# Patient Record
Sex: FEMALE | Race: WHITE | Employment: OTHER | URBAN - NONMETROPOLITAN AREA
[De-identification: names, ages, dates, MRNs, and addresses within clinical notes are randomized per-mention and may not be internally consistent; named-entity substitution may affect disease eponyms.]

---

## 2018-07-10 RX ORDER — CARVEDILOL 25 MG/1
25 TABLET ORAL 2 TIMES DAILY WITH MEALS
Qty: 180 TABLET | Refills: 3 | Status: SHIPPED | OUTPATIENT
Start: 2018-07-10 | End: 2018-10-30 | Stop reason: SDUPTHER

## 2018-10-12 ENCOUNTER — TELEPHONE (OUTPATIENT)
Dept: CARDIOLOGY | Age: 83
End: 2018-10-12

## 2018-10-12 DIAGNOSIS — E78.5 DYSLIPIDEMIA: ICD-10-CM

## 2018-10-12 DIAGNOSIS — I48.0 PAROXYSMAL ATRIAL FIBRILLATION (HCC): ICD-10-CM

## 2018-10-12 DIAGNOSIS — I10 ESSENTIAL HYPERTENSION: ICD-10-CM

## 2018-10-12 DIAGNOSIS — E05.90 HYPERTHYROIDISM: ICD-10-CM

## 2018-10-12 DIAGNOSIS — I48.0 PAROXYSMAL ATRIAL FIBRILLATION (HCC): Primary | ICD-10-CM

## 2018-10-12 DIAGNOSIS — K44.9 HIATAL HERNIA: ICD-10-CM

## 2018-10-12 DIAGNOSIS — F41.9 ANXIETY: ICD-10-CM

## 2018-10-12 PROBLEM — K51.90 ULCERATIVE COLITIS (HCC): Status: ACTIVE | Noted: 2018-10-12

## 2018-10-12 PROBLEM — M19.90 OSTEOARTHRITIS: Status: ACTIVE | Noted: 2018-10-12

## 2018-10-12 PROBLEM — F32.A DEPRESSION: Status: ACTIVE | Noted: 2018-10-12

## 2018-10-12 PROBLEM — K21.9 GERD (GASTROESOPHAGEAL REFLUX DISEASE): Status: ACTIVE | Noted: 2018-10-12

## 2018-10-12 PROBLEM — E11.9 TYPE 2 DIABETES MELLITUS (HCC): Status: ACTIVE | Noted: 2018-10-12

## 2018-10-12 RX ORDER — CLONIDINE HYDROCHLORIDE 0.1 MG/1
0.2 TABLET ORAL NIGHTLY
COMMUNITY
End: 2020-06-24 | Stop reason: SDUPTHER

## 2018-10-12 RX ORDER — WARFARIN SODIUM 2 MG/1
2 TABLET ORAL DAILY
Qty: 30 TABLET | Refills: 3 | Status: SHIPPED | OUTPATIENT
Start: 2018-10-12 | End: 2018-10-30 | Stop reason: SDUPTHER

## 2018-10-12 RX ORDER — OMEPRAZOLE 20 MG/1
20 CAPSULE, DELAYED RELEASE ORAL NIGHTLY
COMMUNITY

## 2018-10-12 RX ORDER — PRAVASTATIN SODIUM 40 MG
40 TABLET ORAL DAILY
COMMUNITY

## 2018-10-12 RX ORDER — WARFARIN SODIUM 5 MG/1
5 TABLET ORAL
COMMUNITY
End: 2018-10-30 | Stop reason: ALTCHOICE

## 2018-10-12 RX ORDER — WARFARIN SODIUM 5 MG/1
5 TABLET ORAL DAILY
Qty: 30 TABLET | Refills: 0 | Status: CANCELLED | OUTPATIENT
Start: 2018-10-12

## 2018-10-12 RX ORDER — TRAMADOL HYDROCHLORIDE 50 MG/1
50 TABLET ORAL EVERY 6 HOURS PRN
COMMUNITY
End: 2018-10-30 | Stop reason: ALTCHOICE

## 2018-10-12 RX ORDER — LEVOTHYROXINE SODIUM 0.1 MG/1
100 TABLET ORAL DAILY
COMMUNITY

## 2018-10-12 RX ORDER — OXYCODONE HYDROCHLORIDE AND ACETAMINOPHEN 325; 5 MG/5ML; MG/5ML
SOLUTION ORAL EVERY 4 HOURS PRN
COMMUNITY
End: 2018-10-30 | Stop reason: ALTCHOICE

## 2018-10-12 RX ORDER — INDAPAMIDE 2.5 MG/1
2.5 TABLET, FILM COATED ORAL EVERY MORNING
COMMUNITY

## 2018-10-12 RX ORDER — DIGOXIN 125 MCG
125 TABLET ORAL DAILY
COMMUNITY
End: 2022-02-21 | Stop reason: SDUPTHER

## 2018-10-30 ENCOUNTER — OFFICE VISIT (OUTPATIENT)
Dept: CARDIOLOGY | Age: 83
End: 2018-10-30
Payer: MEDICARE

## 2018-10-30 VITALS
OXYGEN SATURATION: 96 % | SYSTOLIC BLOOD PRESSURE: 122 MMHG | WEIGHT: 151.4 LBS | HEIGHT: 62 IN | HEART RATE: 85 BPM | BODY MASS INDEX: 27.86 KG/M2 | DIASTOLIC BLOOD PRESSURE: 62 MMHG

## 2018-10-30 DIAGNOSIS — I48.0 PAROXYSMAL ATRIAL FIBRILLATION (HCC): ICD-10-CM

## 2018-10-30 DIAGNOSIS — I48.20 CHRONIC ATRIAL FIBRILLATION (HCC): ICD-10-CM

## 2018-10-30 DIAGNOSIS — I10 ESSENTIAL HYPERTENSION: Primary | ICD-10-CM

## 2018-10-30 PROCEDURE — G8484 FLU IMMUNIZE NO ADMIN: HCPCS | Performed by: INTERNAL MEDICINE

## 2018-10-30 PROCEDURE — 4040F PNEUMOC VAC/ADMIN/RCVD: CPT | Performed by: INTERNAL MEDICINE

## 2018-10-30 PROCEDURE — 1123F ACP DISCUSS/DSCN MKR DOCD: CPT | Performed by: INTERNAL MEDICINE

## 2018-10-30 PROCEDURE — 1036F TOBACCO NON-USER: CPT | Performed by: INTERNAL MEDICINE

## 2018-10-30 PROCEDURE — G8427 DOCREV CUR MEDS BY ELIG CLIN: HCPCS | Performed by: INTERNAL MEDICINE

## 2018-10-30 PROCEDURE — 99213 OFFICE O/P EST LOW 20 MIN: CPT | Performed by: INTERNAL MEDICINE

## 2018-10-30 PROCEDURE — 1090F PRES/ABSN URINE INCON ASSESS: CPT | Performed by: INTERNAL MEDICINE

## 2018-10-30 PROCEDURE — 93000 ELECTROCARDIOGRAM COMPLETE: CPT | Performed by: INTERNAL MEDICINE

## 2018-10-30 PROCEDURE — G8419 CALC BMI OUT NRM PARAM NOF/U: HCPCS | Performed by: INTERNAL MEDICINE

## 2018-10-30 PROCEDURE — G8400 PT W/DXA NO RESULTS DOC: HCPCS | Performed by: INTERNAL MEDICINE

## 2018-10-30 PROCEDURE — 1101F PT FALLS ASSESS-DOCD LE1/YR: CPT | Performed by: INTERNAL MEDICINE

## 2018-10-30 RX ORDER — FERROUS SULFATE 325(65) MG
325 TABLET ORAL
COMMUNITY

## 2018-10-30 RX ORDER — LOSARTAN POTASSIUM 25 MG/1
20 TABLET ORAL DAILY
COMMUNITY
End: 2019-05-01 | Stop reason: ALTCHOICE

## 2018-10-30 RX ORDER — LANOLIN ALCOHOL/MO/W.PET/CERES
1000 CREAM (GRAM) TOPICAL DAILY
COMMUNITY

## 2018-10-31 ENCOUNTER — TELEPHONE (OUTPATIENT)
Dept: CARDIOLOGY | Age: 83
End: 2018-10-31

## 2018-10-31 PROBLEM — I48.20 CHRONIC ATRIAL FIBRILLATION (HCC): Status: ACTIVE | Noted: 2018-10-12

## 2018-10-31 RX ORDER — WARFARIN SODIUM 2 MG/1
2 TABLET ORAL DAILY
Qty: 90 TABLET | Refills: 3 | Status: SHIPPED | OUTPATIENT
Start: 2018-10-31 | End: 2018-11-01 | Stop reason: ALTCHOICE

## 2018-10-31 RX ORDER — CARVEDILOL 25 MG/1
25 TABLET ORAL 2 TIMES DAILY WITH MEALS
Qty: 180 TABLET | Refills: 3 | Status: SHIPPED | OUTPATIENT
Start: 2018-10-31 | End: 2020-01-16

## 2018-10-31 ASSESSMENT — ENCOUNTER SYMPTOMS
NAUSEA: 0
ABDOMINAL DISTENTION: 0
CHOKING: 0
APNEA: 0
CHEST TIGHTNESS: 0
WHEEZING: 0
BACK PAIN: 0
BLOOD IN STOOL: 0
COUGH: 0
SHORTNESS OF BREATH: 0

## 2018-10-31 NOTE — PROGRESS NOTES
today:   Atrial fibrillation with VR of 74, low voltage in the limb leads and NSSTTWCs. Assessment / Plan:  1. Edema - multiple causes possible - diastolic dysfunction first among them but also includes venous insufficiency, ANA, and increased right-sided pressures often seen in elderly females. Will obtain an ECHO in evaluation. 2. AF - rate controlled and exam does not suggest overt failure  3. Hypertension - controlled  4.  Anticoagulation - discussed the option of a newer agent - she is going to check prices      Electronically sign by Bernice Guadarrama MD 10/31/2018 7:25 AM

## 2018-11-01 DIAGNOSIS — I48.20 CHRONIC ATRIAL FIBRILLATION (HCC): Primary | ICD-10-CM

## 2018-11-05 ENCOUNTER — TELEPHONE (OUTPATIENT)
Dept: CARDIOLOGY | Age: 83
End: 2018-11-05

## 2018-11-05 NOTE — TELEPHONE ENCOUNTER
Teja Flower,  I have faxed the order if you wouldn't mind to fax the pre-cert info. If it is not completed yet let me know.

## 2018-11-19 ENCOUNTER — TELEPHONE (OUTPATIENT)
Dept: CARDIOLOGY | Age: 83
End: 2018-11-19

## 2018-11-19 NOTE — TELEPHONE ENCOUNTER
Dr. Mable Clark,  Please let me know what I can tell patient and I will call her with result.  It is scanned under media from [facility] [place] [place] [facility] \[facility]\""

## 2019-01-29 ENCOUNTER — TELEPHONE (OUTPATIENT)
Dept: CARDIOLOGY | Age: 84
End: 2019-01-29

## 2019-01-29 RX ORDER — WARFARIN SODIUM 2 MG/1
2 TABLET ORAL
COMMUNITY
End: 2019-05-01

## 2019-02-06 ENCOUNTER — ANTI-COAG VISIT (OUTPATIENT)
Dept: CARDIOLOGY | Age: 84
End: 2019-02-06

## 2019-02-06 DIAGNOSIS — I48.20 CHRONIC ATRIAL FIBRILLATION (HCC): Primary | ICD-10-CM

## 2019-02-25 LAB — INR BLD: 3.6

## 2019-02-27 ENCOUNTER — ANTI-COAG VISIT (OUTPATIENT)
Dept: CARDIOLOGY | Age: 84
End: 2019-02-27

## 2019-02-27 DIAGNOSIS — I48.20 CHRONIC ATRIAL FIBRILLATION (HCC): ICD-10-CM

## 2019-03-04 ENCOUNTER — TELEPHONE (OUTPATIENT)
Dept: CARDIOLOGY | Age: 84
End: 2019-03-04

## 2019-03-04 LAB — INR BLD: 2.9

## 2019-03-06 ENCOUNTER — ANTI-COAG VISIT (OUTPATIENT)
Dept: CARDIOLOGY | Age: 84
End: 2019-03-06

## 2019-03-06 DIAGNOSIS — I48.20 CHRONIC ATRIAL FIBRILLATION (HCC): ICD-10-CM

## 2019-03-21 LAB — INR BLD: 2.5

## 2019-03-22 ENCOUNTER — ANTI-COAG VISIT (OUTPATIENT)
Dept: CARDIOLOGY | Age: 84
End: 2019-03-22

## 2019-03-22 DIAGNOSIS — I48.20 CHRONIC ATRIAL FIBRILLATION (HCC): ICD-10-CM

## 2019-04-09 ENCOUNTER — ANTI-COAG VISIT (OUTPATIENT)
Dept: CARDIOLOGY | Age: 84
End: 2019-04-09
Payer: MEDICARE

## 2019-04-09 DIAGNOSIS — I48.20 CHRONIC ATRIAL FIBRILLATION (HCC): ICD-10-CM

## 2019-04-09 LAB — INR BLD: 2.7

## 2019-04-09 PROCEDURE — 85610 PROTHROMBIN TIME: CPT | Performed by: CLINICAL NURSE SPECIALIST

## 2019-04-19 ENCOUNTER — TELEPHONE (OUTPATIENT)
Dept: CARDIOLOGY | Age: 84
End: 2019-04-19

## 2019-04-19 NOTE — TELEPHONE ENCOUNTER
Pt has appt with Lynette Alcala on Monday 4-22-19. She ONLY wants to SEE Dr Shahnaz Iqbal. Please call the pt to cici this appt with Dr Bren Dillard. She is expecting a call back today.

## 2019-05-01 ENCOUNTER — OFFICE VISIT (OUTPATIENT)
Dept: CARDIOLOGY | Age: 84
End: 2019-05-01
Payer: MEDICARE

## 2019-05-01 VITALS
BODY MASS INDEX: 29.08 KG/M2 | HEART RATE: 78 BPM | SYSTOLIC BLOOD PRESSURE: 128 MMHG | DIASTOLIC BLOOD PRESSURE: 80 MMHG | WEIGHT: 158 LBS | HEIGHT: 62 IN

## 2019-05-01 DIAGNOSIS — I34.0 MODERATE MITRAL REGURGITATION: ICD-10-CM

## 2019-05-01 DIAGNOSIS — I48.20 CHRONIC ATRIAL FIBRILLATION (HCC): Primary | ICD-10-CM

## 2019-05-01 PROCEDURE — G8427 DOCREV CUR MEDS BY ELIG CLIN: HCPCS | Performed by: INTERNAL MEDICINE

## 2019-05-01 PROCEDURE — 4040F PNEUMOC VAC/ADMIN/RCVD: CPT | Performed by: INTERNAL MEDICINE

## 2019-05-01 PROCEDURE — 1036F TOBACCO NON-USER: CPT | Performed by: INTERNAL MEDICINE

## 2019-05-01 PROCEDURE — 99212 OFFICE O/P EST SF 10 MIN: CPT | Performed by: INTERNAL MEDICINE

## 2019-05-01 PROCEDURE — 93000 ELECTROCARDIOGRAM COMPLETE: CPT | Performed by: INTERNAL MEDICINE

## 2019-05-01 PROCEDURE — 1090F PRES/ABSN URINE INCON ASSESS: CPT | Performed by: INTERNAL MEDICINE

## 2019-05-01 PROCEDURE — G8419 CALC BMI OUT NRM PARAM NOF/U: HCPCS | Performed by: INTERNAL MEDICINE

## 2019-05-01 PROCEDURE — 1123F ACP DISCUSS/DSCN MKR DOCD: CPT | Performed by: INTERNAL MEDICINE

## 2019-05-01 RX ORDER — POTASSIUM CHLORIDE 1.5 G/1.77G
20 POWDER, FOR SOLUTION ORAL DAILY
COMMUNITY

## 2019-05-01 NOTE — PROGRESS NOTES
61-year-old lady return today to review the results of her echocardiogram.  Have been troubled with some lower extremity edema which was suspected to be multifactorial.  She had her echocardiogram obtained in [place] which was interpreted as showing normal left ventricular systolic function, LVH, left atrial enlargement, and moderate mitral regurgitation. On return today pressure is 128/80 with an irregular pulse of 78. 1+ lower extremity edema. EKG reveals atrial fibrillation with a controlled ventricular response rate poor R-wave progression and diffuse ST-T wave abnormalities. Assessment/plan:  1. Lower extremity edema - likely multifactorial but this echo was inadequate to fully assess cardiac contributions. There is no estimate of right-sided pressures or the degree of left ventricular diastolic dysfunction which is much more common cause of edema and only ladies band is systolic dysfunction. We will obtain a full echo on her return in 6 months. 2.  Anticoagulation - was to switch from Coumadin back to Eliquis. Weight gain creatinine of a loud for regular dose of 5 b.i.d. Last INR 1.9. Advised her to hold her Coumadin the next 3 days at which time we'll restart the Eliquis at 5 b.i.d.

## 2019-06-14 PROBLEM — I48.20 CHRONIC ATRIAL FIBRILLATION (HCC): Status: RESOLVED | Noted: 2018-10-12 | Resolved: 2019-05-01

## 2019-11-13 ENCOUNTER — HOSPITAL ENCOUNTER (OUTPATIENT)
Dept: NON INVASIVE DIAGNOSTICS | Age: 84
Discharge: HOME | End: 2019-11-13
Payer: MEDICARE

## 2019-11-13 DIAGNOSIS — I48.20 CHRONIC ATRIAL FIBRILLATION (HCC): ICD-10-CM

## 2019-11-13 DIAGNOSIS — I34.0 MODERATE MITRAL REGURGITATION: ICD-10-CM

## 2019-11-13 LAB
LV EF: 58 %
LVEF MODALITY: NORMAL

## 2019-11-13 PROCEDURE — 93306 TTE W/DOPPLER COMPLETE: CPT

## 2019-11-14 DIAGNOSIS — I48.20 CHRONIC ATRIAL FIBRILLATION (HCC): ICD-10-CM

## 2019-11-14 RX ORDER — APIXABAN 5 MG/1
TABLET, FILM COATED ORAL
Qty: 180 TABLET | Refills: 3 | Status: SHIPPED | OUTPATIENT
Start: 2019-11-14 | End: 2020-08-14

## 2019-12-13 ENCOUNTER — OFFICE VISIT (OUTPATIENT)
Dept: CARDIOLOGY | Age: 84
End: 2019-12-13
Payer: MEDICARE

## 2019-12-13 VITALS
SYSTOLIC BLOOD PRESSURE: 138 MMHG | WEIGHT: 157 LBS | HEIGHT: 62 IN | HEART RATE: 78 BPM | DIASTOLIC BLOOD PRESSURE: 72 MMHG | BODY MASS INDEX: 28.89 KG/M2

## 2019-12-13 DIAGNOSIS — Z51.81 ENCOUNTER FOR MONITORING DIGOXIN THERAPY: Primary | ICD-10-CM

## 2019-12-13 DIAGNOSIS — Z79.899 ENCOUNTER FOR MONITORING DIGOXIN THERAPY: Primary | ICD-10-CM

## 2019-12-13 DIAGNOSIS — I48.20 CHRONIC ATRIAL FIBRILLATION (HCC): ICD-10-CM

## 2019-12-13 DIAGNOSIS — I48.20 CHRONIC ATRIAL FIBRILLATION (HCC): Primary | ICD-10-CM

## 2019-12-13 PROCEDURE — 93000 ELECTROCARDIOGRAM COMPLETE: CPT | Performed by: INTERNAL MEDICINE

## 2019-12-13 PROCEDURE — 1123F ACP DISCUSS/DSCN MKR DOCD: CPT | Performed by: INTERNAL MEDICINE

## 2019-12-13 PROCEDURE — 1090F PRES/ABSN URINE INCON ASSESS: CPT | Performed by: INTERNAL MEDICINE

## 2019-12-13 PROCEDURE — G8484 FLU IMMUNIZE NO ADMIN: HCPCS | Performed by: INTERNAL MEDICINE

## 2019-12-13 PROCEDURE — 4040F PNEUMOC VAC/ADMIN/RCVD: CPT | Performed by: INTERNAL MEDICINE

## 2019-12-13 PROCEDURE — G8417 CALC BMI ABV UP PARAM F/U: HCPCS | Performed by: INTERNAL MEDICINE

## 2019-12-13 PROCEDURE — G8427 DOCREV CUR MEDS BY ELIG CLIN: HCPCS | Performed by: INTERNAL MEDICINE

## 2019-12-13 PROCEDURE — 1036F TOBACCO NON-USER: CPT | Performed by: INTERNAL MEDICINE

## 2019-12-13 PROCEDURE — 99212 OFFICE O/P EST SF 10 MIN: CPT | Performed by: INTERNAL MEDICINE

## 2019-12-13 RX ORDER — FOLIC ACID 1 MG/1
1 TABLET ORAL
COMMUNITY
End: 2020-05-08

## 2020-01-16 RX ORDER — CARVEDILOL 25 MG/1
TABLET ORAL
Qty: 60 TABLET | Refills: 5 | Status: SHIPPED | OUTPATIENT
Start: 2020-01-16 | End: 2020-08-10

## 2020-01-16 NOTE — TELEPHONE ENCOUNTER
Rafia Serna is requesting a refill of their    Carvedilol 25 MG TAKE 1 TABLET TWICE DAILY WITH MEALS     . Please advise.  Patient will be out of medication 1-      Last Appt:  12/13/2019  Next Appt:   5/15/2020  Preferred pharmacy: [address] [place] [address]

## 2020-05-07 ENCOUNTER — TELEPHONE (OUTPATIENT)
Dept: CARDIOLOGY | Age: 85
End: 2020-05-07

## 2020-05-08 ENCOUNTER — VIRTUAL VISIT (OUTPATIENT)
Dept: CARDIOLOGY | Age: 85
End: 2020-05-08
Payer: MEDICARE

## 2020-05-08 VITALS — HEIGHT: 62 IN | WEIGHT: 155 LBS | BODY MASS INDEX: 28.52 KG/M2

## 2020-05-08 PROCEDURE — 99441 PR PHYS/QHP TELEPHONE EVALUATION 5-10 MIN: CPT | Performed by: INTERNAL MEDICINE

## 2020-05-08 RX ORDER — LOPERAMIDE HYDROCHLORIDE 2 MG/1
2 CAPSULE ORAL 2 TIMES DAILY PRN
COMMUNITY

## 2020-05-08 RX ORDER — AMLODIPINE BESYLATE 2.5 MG/1
2.5 TABLET ORAL DAILY
Qty: 90 TABLET | Refills: 1 | Status: SHIPPED | OUTPATIENT
Start: 2020-05-08 | End: 2020-06-24

## 2020-05-08 NOTE — PATIENT INSTRUCTIONS
1. Adding Amlodipine 2.5 mg qHs  2. Possible ankle swelling as side effect  3. Check morning pressures - we will call in a week  4.  Continue social distancing

## 2020-06-23 ENCOUNTER — TELEPHONE (OUTPATIENT)
Dept: CARDIOLOGY | Age: 85
End: 2020-06-23

## 2020-06-24 RX ORDER — CLONIDINE HYDROCHLORIDE 0.3 MG/1
0.3 TABLET ORAL NIGHTLY
Qty: 30 TABLET | Refills: 3 | Status: SHIPPED | OUTPATIENT
Start: 2020-06-24 | End: 2020-11-02

## 2020-06-24 NOTE — TELEPHONE ENCOUNTER
Pt is stopping the amlopidine she doesn't like it and will increase the clonidine. Pt verbally understood and new script for clonidine sent.

## 2020-07-09 ENCOUNTER — OFFICE VISIT (OUTPATIENT)
Dept: CARDIOLOGY | Age: 85
End: 2020-07-09
Payer: MEDICARE

## 2020-07-09 VITALS
DIASTOLIC BLOOD PRESSURE: 84 MMHG | SYSTOLIC BLOOD PRESSURE: 144 MMHG | BODY MASS INDEX: 27.97 KG/M2 | HEIGHT: 62 IN | HEART RATE: 82 BPM | WEIGHT: 152 LBS

## 2020-07-09 PROCEDURE — G8427 DOCREV CUR MEDS BY ELIG CLIN: HCPCS | Performed by: CLINICAL NURSE SPECIALIST

## 2020-07-09 PROCEDURE — 1090F PRES/ABSN URINE INCON ASSESS: CPT | Performed by: CLINICAL NURSE SPECIALIST

## 2020-07-09 PROCEDURE — 1036F TOBACCO NON-USER: CPT | Performed by: CLINICAL NURSE SPECIALIST

## 2020-07-09 PROCEDURE — 99214 OFFICE O/P EST MOD 30 MIN: CPT | Performed by: CLINICAL NURSE SPECIALIST

## 2020-07-09 PROCEDURE — 93000 ELECTROCARDIOGRAM COMPLETE: CPT | Performed by: CLINICAL NURSE SPECIALIST

## 2020-07-09 PROCEDURE — G8417 CALC BMI ABV UP PARAM F/U: HCPCS | Performed by: CLINICAL NURSE SPECIALIST

## 2020-07-09 PROCEDURE — 1123F ACP DISCUSS/DSCN MKR DOCD: CPT | Performed by: CLINICAL NURSE SPECIALIST

## 2020-07-09 PROCEDURE — 4040F PNEUMOC VAC/ADMIN/RCVD: CPT | Performed by: CLINICAL NURSE SPECIALIST

## 2020-07-09 RX ORDER — CLONIDINE HYDROCHLORIDE 0.1 MG/1
0.1 TABLET ORAL DAILY
Qty: 60 TABLET | Refills: 3 | Status: SHIPPED | OUTPATIENT
Start: 2020-07-09 | End: 2021-03-10

## 2020-07-09 RX ORDER — ALPRAZOLAM 0.25 MG/1
0.25 TABLET ORAL NIGHTLY PRN
COMMUNITY
End: 2020-08-05

## 2020-07-09 NOTE — PROGRESS NOTES
[address] [place], [address]  [address]  Phone: [phone]  Fax: [phone]    OFFICE VISIT:  2020    Darci Hart - : 1933    Reason For Visit:  Felipe Hong is a 80 y.o. female who is here for Follow-up; Atrial Fibrillation; and Hypertension  History of hypertension, valvular disease and chronic atrial fibrillation. Has been maintained anticoagulated on Eliquis  No valve disease with last 2D echo 2019 showed preserved LV function with EF 55 to 60%. Mild to moderate MR. Mildly dilated atria  Had virtual visit in May with Dr. Peggy Mcelroy. Blood pressure was elevated and low-dose amlodipine was added  She found the following week with complaints of swelling. She had to stop the amlodipine and that is improved. Since that time she has lost her , he passed away from a brain bleed in [place]. She was next ordered her daughter. She has been staying with her same    The past 2 days she has noticed in the afternoon her heart racing and blood pressure elevated. Blood pressure went to 180/120 and her heart rate was in the 130s.  2 days ago she took an extra carvedilol and it calmed down. She went to see her primary care doctor yesterday      Emerald Mon denies exertional chest pain, shortness of breath, orthopnea, paroxysmal nocturnal dyspnea, syncope, presyncope,. The patient denies numbness or weakness to suggest cerebrovascular accident or transient ischemic attack. Shannen Staton MD is PCP and follows labs .   Darci Hart has the following history as recorded in [facility]:    Patient Active Problem List    Diagnosis Date Noted    Essential hypertension 10/12/2018    Dyslipidemia 10/12/2018    Type 2 diabetes mellitus ([facility] [address] 10/12/2018    Hiatal hernia 10/12/2018    GERD (gastroesophageal reflux disease) 10/12/2018    Hyperthyroidism 10/12/2018    Ulcerative colitis ([facility] [address] 10/12/2018    Anxiety 10/12/2018    Depression 10/12/2018    Osteoarthritis 10/12/2018     Past Medical History:   Diagnosis Date    Afib ([facility] [address]     Diabetes mellitus ([facility] [address]     Eczema     GERD (gastroesophageal reflux disease)     Hyperlipidemia     Hypertension     Osteoarthritis     Thyroid disease      Past Surgical History:   Procedure Laterality Date    JOINT REPLACEMENT Right 2013    SHOULDER SURGERY Left 2006     History reviewed. No pertinent family history. Social History     Tobacco Use    Smoking status: Never Smoker    Smokeless tobacco: Never Used   Substance Use Topics    Alcohol use: No      Current Outpatient Medications   Medication Sig Dispense Refill    ALPRAZolam (XANAX) 0.25 MG tablet Take 0.25 mg by mouth nightly as needed for Sleep.       cloNIDine (CATAPRES) 0.1 MG tablet Take 1 tablet by mouth daily Take at midday and can take an extra dose if BP > 160/10 60 tablet 3    cloNIDine (CATAPRES) 0.3 MG tablet Take 1 tablet by mouth nightly 30 tablet 3    loperamide (RA ANTI-DIARRHEAL) 2 MG capsule Take 2 mg by mouth 4 times daily as needed for Diarrhea      carvedilol (COREG) 25 MG tablet TAKE 1 TABLET TWICE DAILY WITH MEALS 60 tablet 5    ELIQUIS 5 MG TABS tablet TAKE ONE TABLET BY MOUTH 2 TIMES A  tablet 3    potassium chloride (KLOR-CON) 20 MEQ packet Take 20 mEq by mouth daily      ferrous sulfate 325 (65 Fe) MG tablet Take 325 mg by mouth daily (with breakfast)      vitamin B-12 (CYANOCOBALAMIN) 1000 MCG tablet Take 1,000 mcg by mouth daily      indapamide (LOZOL) 2.5 MG tablet Take 2.5 mg by mouth every morning      pravastatin (PRAVACHOL) 40 MG tablet Take 40 mg by mouth daily      omeprazole (PRILOSEC) 20 MG delayed release capsule Take 20 mg by mouth daily      metFORMIN (GLUCOPHAGE) 500 MG tablet Take 1,000 mg by mouth 2 times daily (with meals)       levothyroxine (SYNTHROID) 100 MCG tablet Take 100 mcg by mouth Daily      digoxin (LANOXIN) 125 MCG tablet Take 125 mcg by mouth daily      vitamin D 1000 units bruits. No mass. Respiratory - Lungs are clear bilaterally. No wheezes or rales. Normal effort without use of accessory muscles. Cardiovascular - Heart has irregular rhythm and controlled rate. No murmurs, rubs or gallops. + pedal pulses and no varicosities. Abdominal -  Soft, nontender, nondistended. Bowel sounds are intact. Extremities - No clubbing, cyanosis, or  edema. Musculoskeletal -  No clubbing . No Osler's nodes. Gait -using walker. No kyphosis or scoliosis. Skin -  no statis ulcers or dermatitis. Neurological - No focal signs are identified. Oriented to person, place and time. Psychiatric -  Appropriate affect and mood. Assessment:     Diagnosis Orders   1. Chronic atrial fibrillation  EKG 12 lead   2. Essential hypertension     3. Nonrheumatic mitral valve regurgitation       Data:  BP Readings from Last 3 Encounters:   07/09/20 (!) 144/84   12/13/19 138/72   05/01/19 128/80    Pulse Readings from Last 3 Encounters:   07/09/20 82   12/13/19 78   05/01/19 78        Wt Readings from Last 3 Encounters:   07/09/20 152 lb (68.9 kg)   05/08/20 155 lb (70.3 kg)   12/13/19 157 lb (71.2 kg)     EKG shows atrial fibrillation with a rate of 82. Nonspecific ST depression. Compared to previous EKG is unchanged    Patient remains rate controlled on digoxin and carvedilol and anticoagulated on Eliquis    Recent elevation in blood pressure and heart rate at times. Her clonidine was uptitrated to 0.3 mg nightly. She had been previously intolerant due to swelling with the amlodipine. Her daughter states it was not significant but she does not wish to try that again. We will try adding a dose of clonidine 0.1 mg around lunchtime. This should help with her afternoon spikes. She states her blood pressure in the morning is been okay. Encouraged her to check her blood pressure at different times of the day.   She has been through a lot recently with the loss of her  of 71 years.    I am sure some of the stress ingestion to that is contributing. Her PCP did give her a Xanax to take as needed. We discussed that she is feeling particularly anxious or heart is racing and it does not come down with relaxation and the clonidine she can try taking half of her Xanax. She has not been taking them very often. She is taking Coreg 25 mg twice a day along with digoxin. Rate is been fairly well controlled until recently. She does remain anticoagulated on Eliquis. Again we discussed her chronic atrial fibrillation and the danger is preventing blood clot. Much of the visit was spent reeducating her about rationale for treatment of atrial fibrillation and anticoagulation. Her daughter was here to help her understand as well. She seems to have good family  support with the recent loss of her       Plan  Take clonidine 0.1mg around noon for BP spikes in the afternoon  Continue the other BP medications   Follow up in Aug With Dr. Erin Willson as planned  Call with any questions or concerns  Follow up with Deena Gibson MD for non cardiac problems  Report any new problems  Cardiovascular Fitness-Exercise as tolerated. Strive for 30 minutes of exercise most days of the week. Cardiac / Healthy Diet  Continue current medications as directed  Continue plan of treatment  It is always recommended that you bring your medications bottles with you to each visit - this is for your safety! JONATHAN Oswald dragon/transcription disclaimer: Much of this encounter note is electronic transcription/translation of spoken language to printed tach. Electronic translation of spoken language may be erroneous, or at times, nonsensical words or phrases may be inadvertently transcribed.  Although, I have reviewed the note for such errors, some may still exist.

## 2020-07-09 NOTE — PATIENT INSTRUCTIONS
Take clonidine 0.1mg around noon for BP spikes in the afternoon  Continue the other BP medications   Follow up in Aug With Dr. Hilton Clifford   Call with any questions or concerns  Follow up with Jing Staton MD for non cardiac problems  Report any new problems  Cardiovascular Fitness-Exercise as tolerated. Strive for 30 minutes of exercise most days of the week. Cardiac / Healthy Diet  Continue current medications as directed  Continue plan of treatment  It is always recommended that you bring your medications bottles with you to each visit - this is for your safety!

## 2020-08-05 ENCOUNTER — OFFICE VISIT (OUTPATIENT)
Dept: CARDIOLOGY | Age: 85
End: 2020-08-05
Payer: MEDICARE

## 2020-08-05 VITALS
HEIGHT: 62 IN | BODY MASS INDEX: 27.6 KG/M2 | WEIGHT: 150 LBS | HEART RATE: 83 BPM | DIASTOLIC BLOOD PRESSURE: 82 MMHG | SYSTOLIC BLOOD PRESSURE: 138 MMHG

## 2020-08-05 PROCEDURE — 99212 OFFICE O/P EST SF 10 MIN: CPT | Performed by: INTERNAL MEDICINE

## 2020-08-05 PROCEDURE — 93000 ELECTROCARDIOGRAM COMPLETE: CPT | Performed by: INTERNAL MEDICINE

## 2020-08-05 PROCEDURE — 1123F ACP DISCUSS/DSCN MKR DOCD: CPT | Performed by: INTERNAL MEDICINE

## 2020-08-05 PROCEDURE — 4040F PNEUMOC VAC/ADMIN/RCVD: CPT | Performed by: INTERNAL MEDICINE

## 2020-08-05 PROCEDURE — 1036F TOBACCO NON-USER: CPT | Performed by: INTERNAL MEDICINE

## 2020-08-05 PROCEDURE — G8417 CALC BMI ABV UP PARAM F/U: HCPCS | Performed by: INTERNAL MEDICINE

## 2020-08-05 PROCEDURE — 1090F PRES/ABSN URINE INCON ASSESS: CPT | Performed by: INTERNAL MEDICINE

## 2020-08-05 PROCEDURE — G8427 DOCREV CUR MEDS BY ELIG CLIN: HCPCS | Performed by: INTERNAL MEDICINE

## 2020-08-05 RX ORDER — LOSARTAN POTASSIUM 50 MG/1
TABLET ORAL
Qty: 60 TABLET | Refills: 0 | COMMUNITY
Start: 2020-08-05 | End: 2020-08-06 | Stop reason: ALTCHOICE

## 2020-08-05 NOTE — PROGRESS NOTES
20-year-old with history of dyslipidemia, diabetes, hypertension, and atrial fibrillation returns for follow-up visit. Atrial fibrillation has been persistent and hypertension is been an ongoing challenge. Her pressures have been somewhat labile and she not infrequently documents values in the 170s or 180s. She is compliant with her medical therapy. The most recent change made from this vantage point with the addition of amlodipine which she stopped soon after starting because of lower extremity edema. She continues to take clonidine 0.3 nightly, 0.1 midday, carvedilol 25 twice daily, and indapamide 2.5 mg daily. She in addition has been troubled with some recent presyncope consistent with orthostatic hypotension. On exam today she carries 150 pounds in a 5 foot 2 inch frame. Pressure is 138/82 with an irregular pulse in the 80s. Elderly lady who is examined in a wheelchair because of difficulty mounting the examination table. No elevation of central venous pressure 90 degrees with normal carotid upstrokes and no bruits. Chest clear to auscultation. Heart sounds are irregular with a 1/6 systolic murmur. No significant lower extremity edema. EKG reveals atrial fibrillation with a ventricular sponsor rate of 83 poor R wave progression with low voltage in the precordial leads and diffuse ST-T wave abnormalities. Assessment/plan:  1. Hypertension -she advises me that her renal function is normal [no values in our chart]. She is allergic to ACE inhibitors. Will give her 3 days of losartan 25 mg a day with instructions to increase to 50 mg a day if she continues to run 140 or greater.   If need be will increase her to 100 mg daily

## 2020-08-06 ENCOUNTER — TELEPHONE (OUTPATIENT)
Dept: CARDIOLOGY | Age: 85
End: 2020-08-06

## 2020-08-06 RX ORDER — LOSARTAN POTASSIUM 25 MG/1
25 TABLET ORAL DAILY
Qty: 90 TABLET | Refills: 1 | Status: SHIPPED | OUTPATIENT
Start: 2020-08-06 | End: 2021-01-20

## 2020-08-06 NOTE — TELEPHONE ENCOUNTER
The pt is calling about the Losartan that was called in yesterday. The pharmacy says they don't have the prescription.

## 2020-08-10 ENCOUNTER — TELEPHONE (OUTPATIENT)
Dept: CARDIOLOGY | Age: 85
End: 2020-08-10

## 2020-08-10 RX ORDER — CARVEDILOL 25 MG/1
TABLET ORAL
Qty: 180 TABLET | Refills: 0 | Status: SHIPPED | OUTPATIENT
Start: 2020-08-10 | End: 2020-10-28

## 2020-08-14 RX ORDER — APIXABAN 5 MG/1
TABLET, FILM COATED ORAL
Qty: 60 TABLET | Refills: 5 | Status: SHIPPED | OUTPATIENT
Start: 2020-08-14 | End: 2021-03-02

## 2020-08-25 ENCOUNTER — TELEPHONE (OUTPATIENT)
Dept: CARDIOLOGY | Age: 85
End: 2020-08-25

## 2020-08-25 NOTE — TELEPHONE ENCOUNTER
Patient called to report her blood pressures to me as Dr. Rafa Mai requested. 8/22 119/61 pulse 74    8/25 125/56 pulse 65    Runs like this most of the time. She stated it is only in the 140's in the morning before she takes her medication. Assessment/plan:  1. Hypertension -she advises me that her renal function is normal [no values in our chart]. She is allergic to ACE inhibitors. Will give her 3 days of losartan 25 mg a day with instructions to increase to 50 mg a day if she continues to run 140 or greater.   If need be will increase her to 100 mg daily

## 2020-10-29 RX ORDER — CARVEDILOL 25 MG/1
TABLET ORAL
Qty: 180 TABLET | Refills: 1 | Status: SHIPPED | OUTPATIENT
Start: 2020-10-29 | End: 2021-01-28

## 2020-11-02 RX ORDER — CLONIDINE HYDROCHLORIDE 0.3 MG/1
TABLET ORAL
Qty: 60 TABLET | Refills: 5 | Status: SHIPPED | OUTPATIENT
Start: 2020-11-02 | End: 2021-09-13

## 2021-01-20 DIAGNOSIS — I10 ESSENTIAL HYPERTENSION: ICD-10-CM

## 2021-01-20 RX ORDER — LOSARTAN POTASSIUM 25 MG/1
TABLET ORAL
Qty: 90 TABLET | Refills: 0 | Status: SHIPPED | OUTPATIENT
Start: 2021-01-20 | End: 2021-06-01 | Stop reason: SDUPTHER

## 2021-01-28 DIAGNOSIS — I10 ESSENTIAL HYPERTENSION: ICD-10-CM

## 2021-01-28 DIAGNOSIS — I48.20 CHRONIC ATRIAL FIBRILLATION (HCC): ICD-10-CM

## 2021-01-28 RX ORDER — CARVEDILOL 25 MG/1
TABLET ORAL
Qty: 180 TABLET | Refills: 0 | Status: SHIPPED | OUTPATIENT
Start: 2021-01-28 | End: 2021-07-29

## 2021-03-01 DIAGNOSIS — I48.20 CHRONIC ATRIAL FIBRILLATION (HCC): ICD-10-CM

## 2021-03-02 RX ORDER — APIXABAN 5 MG/1
TABLET, FILM COATED ORAL
Qty: 60 TABLET | Refills: 0 | Status: SHIPPED | OUTPATIENT
Start: 2021-03-02 | End: 2021-03-04

## 2021-03-04 DIAGNOSIS — I48.20 CHRONIC ATRIAL FIBRILLATION (HCC): ICD-10-CM

## 2021-03-04 RX ORDER — APIXABAN 5 MG/1
TABLET, FILM COATED ORAL
Qty: 60 TABLET | Refills: 0 | Status: SHIPPED | OUTPATIENT
Start: 2021-03-04 | End: 2021-03-24 | Stop reason: SDUPTHER

## 2021-03-10 RX ORDER — CLONIDINE HYDROCHLORIDE 0.1 MG/1
TABLET ORAL
Qty: 90 TABLET | Refills: 0 | Status: SHIPPED | OUTPATIENT
Start: 2021-03-10 | End: 2021-05-10 | Stop reason: SDUPTHER

## 2021-03-24 ENCOUNTER — OFFICE VISIT (OUTPATIENT)
Dept: CARDIOLOGY CLINIC | Age: 86
End: 2021-03-24
Payer: MEDICARE

## 2021-03-24 VITALS
DIASTOLIC BLOOD PRESSURE: 72 MMHG | BODY MASS INDEX: 27.97 KG/M2 | HEIGHT: 62 IN | OXYGEN SATURATION: 98 % | HEART RATE: 85 BPM | SYSTOLIC BLOOD PRESSURE: 124 MMHG | WEIGHT: 152 LBS

## 2021-03-24 DIAGNOSIS — I48.20 CHRONIC ATRIAL FIBRILLATION (HCC): Primary | ICD-10-CM

## 2021-03-24 PROCEDURE — 4040F PNEUMOC VAC/ADMIN/RCVD: CPT | Performed by: INTERNAL MEDICINE

## 2021-03-24 PROCEDURE — 1036F TOBACCO NON-USER: CPT | Performed by: INTERNAL MEDICINE

## 2021-03-24 PROCEDURE — G8428 CUR MEDS NOT DOCUMENT: HCPCS | Performed by: INTERNAL MEDICINE

## 2021-03-24 PROCEDURE — 99213 OFFICE O/P EST LOW 20 MIN: CPT | Performed by: INTERNAL MEDICINE

## 2021-03-24 PROCEDURE — 1123F ACP DISCUSS/DSCN MKR DOCD: CPT | Performed by: INTERNAL MEDICINE

## 2021-03-24 PROCEDURE — 93000 ELECTROCARDIOGRAM COMPLETE: CPT | Performed by: INTERNAL MEDICINE

## 2021-03-24 PROCEDURE — 1090F PRES/ABSN URINE INCON ASSESS: CPT | Performed by: INTERNAL MEDICINE

## 2021-03-24 PROCEDURE — G8484 FLU IMMUNIZE NO ADMIN: HCPCS | Performed by: INTERNAL MEDICINE

## 2021-03-24 PROCEDURE — G8417 CALC BMI ABV UP PARAM F/U: HCPCS | Performed by: INTERNAL MEDICINE

## 2021-03-24 NOTE — PATIENT INSTRUCTIONS
If you have a random spike in blood pressure, take an extra clonidine for pressures 180 on the top number. Can be taken again 30 minutes later.

## 2021-03-24 NOTE — PROGRESS NOTES
HISTORY  68-year-old lady with a history of dyslipidemia, diabetes, hypertension, and chronic atrial fibrillation returns for routine follow-up. Rate control strategy has been adopted for her atrial fibrillation and she has continued to be covered with Eliquis therapy. Her hypertension has been the focus of recent visits. She was intolerant of amlodipine because of lower extremity edema and at the time of her August 2020 visit was placed on losartan at initial dose of 25 mg a day with instructions to increase to 50 mg a day should her pressure remain over 140. On return today she began losartan at 25 mg a day with for the most part, good control. She did have one instance where her pressure emily to 190/90 without apparent trigger. She denies symptoms associated with her atrial fibrillation, suggestive of congestive failure, or suspicious for angina. She remains on Eliquis. She has had both vaccine inoculations. PHYSICAL EXAM  On exam she carries 152 pounds on a 5 foot 2 inch frame. Pressure is 120/72 with an irregular pulse in the mid 80s. EOMs full, sclerae and conjunctiva normal. PERRLA. Mask in place. Trachea midline with no neck masses. Assessment of internal jugular veins reveals no elevation of central venous pressure at 45 degrees. Carotid pulses normal without delay or bruit. Thyroid normal to palpation. Chest exam reveals normal respiratory effort, no abnormal breath sounds and normal expiratory phase. No skin lesions seen. PMI normal. S1, S2 normal without murmur or ivy or click. Normal bowel sounds without palpable mass or bruit. No clubbing or acrocyanosis. 2+ pretibial edema. General motor strength appears to be within normal limits. Normal range of motion with normal gait. Alert, oriented x 3, memory and cognition normal as reflected by history and conversation.   EKG reveals atrial fibrillation with a ventricular response rate of 85 with delayed R-wave progression and a pre-transitional Q wave precluding exclusion of a previous anterior infarct along with nonspecific ST-T wave abnormalities. ASSESSMENT/PLAN:   1. Hypertension - acceptable control. At age 80 blood pressure spikes were frequent and aggressive treatment sometimes results in episodic hypotension. Advised to take clonidine 0.1 mg should her pressure rises over 170 and lie down. 2.  Dyslipidemia - monitored and managed by Dr. Joyce Bills  3. Atrial fibrillation - successful rate control strategy  4.   Pandemic response - vaccinated

## 2021-05-06 ENCOUNTER — TELEPHONE (OUTPATIENT)
Dept: CARDIOLOGY CLINIC | Age: 86
End: 2021-05-06

## 2021-05-06 NOTE — TELEPHONE ENCOUNTER
Agreed to just use the clonidine as directed.   If she has frequent spikes then let us know and we can further adjust her losartan

## 2021-05-10 DIAGNOSIS — I10 ESSENTIAL HYPERTENSION: Primary | ICD-10-CM

## 2021-05-10 RX ORDER — CLONIDINE HYDROCHLORIDE 0.1 MG/1
0.1 TABLET ORAL PRN
Qty: 90 TABLET | Refills: 3 | Status: SHIPPED | OUTPATIENT
Start: 2021-05-10 | End: 2021-12-14

## 2021-05-26 ENCOUNTER — TELEPHONE (OUTPATIENT)
Dept: CARDIOLOGY CLINIC | Age: 86
End: 2021-05-26

## 2021-05-26 NOTE — TELEPHONE ENCOUNTER
Spoke with patient and advised Jennifer Tabor was correct the 0.1 mg clonidine is prn for spikes in BP and the 0.3 clonidine is her maintenance dose nightly at bedtime. Patient voiced understanding and is writing on the bottle so she can remember.

## 2021-05-26 NOTE — TELEPHONE ENCOUNTER
Patient states she takes her clonidine 0.1 mg bottle states to take one midday. Advised that med list shows to take one tablet PRN for HTN. There is an old script that shows that but looks like you changed it. BP this AM was 128/75 63 and just now 117/63 64. She is questioning wether or not to take clonidine. I advised her that her med list states to only take PRN for HTN and she said he bottle said something different. She wants you to call her back.

## 2021-06-01 DIAGNOSIS — I10 ESSENTIAL HYPERTENSION: ICD-10-CM

## 2021-06-01 RX ORDER — LOSARTAN POTASSIUM 50 MG/1
50 TABLET ORAL DAILY
Qty: 90 TABLET | Refills: 3 | Status: SHIPPED | OUTPATIENT
Start: 2021-06-01 | End: 2021-06-09

## 2021-06-01 NOTE — TELEPHONE ENCOUNTER
Spoke with patient and asked her how she has been taking her medications as there has been some confusion. She advises she takes her losartan 25, indapamide 2.5, and Carvedilol 25 BID as she should. She has been taking her 0.1 dose of Clonidine nightly and her 0.3 dose for spikes of high BP on her own. She stated when she was taking the 0.1 dose for spikes it was not doing anything for her BP. She states with all that she is on her BP gets in the 200's almost daily and would like to see if she needs an increase in meds or if anything should be changed. Please advise in Dr. Sulma Vidal absence.

## 2021-06-01 NOTE — TELEPHONE ENCOUNTER
Patient requesting to speak with you. I tried my best to help her and she said was told differently than what I was trying to tell her. She said he BP keeps spiking after clonidine. She is under the impression that she takes the 0.3 mg when her BP spikes, I advised that was incorrect and that is to take that at bedtime. Advised clonidine 0.1 mg is for spikes. She also wanted to know if she needed to take her carvedilol at night with clonidine and I advised yes it was prescribed BID and she needed to take it. She would not accept what I was trying to tell her and asked to speak with someone else. Advised I would have you call her.

## 2021-06-01 NOTE — TELEPHONE ENCOUNTER
You are correct her losartan 25 mg daily. Indapamide 2.5 mg daily in the morning and carvedilol 25 mg twice a day  The 0.3 mg of clonidine is at bedtime and 0.1 dose is as needed.   If her blood pressure is spiking daily and requiring extra clonidine daily then increase her losartan to 50 mg in the morning    She can still use the clonidine 0.1 mg as needed for blood pressure greater than 180/100

## 2021-06-01 NOTE — TELEPHONE ENCOUNTER
Spoke with patient and advised her to take Indapamide 2.5 mg in the morning first thing along with her first dose of Carvedilol. I advised she take losartan around midday. She wanted to increase it to 50 and see how it works. I have sent in a higher dose for her. She advised she takes her second dose of carvedilol with dinner, I advised that is fine. Then at bedtime take the 0.3 mg of clonidine every night and keep the 0.1 mg as the PRN dose for spikes. Keep a bp log and touch base with us in one week. She voiced understanding.

## 2021-06-02 ENCOUNTER — TELEPHONE (OUTPATIENT)
Dept: CARDIOLOGY CLINIC | Age: 86
End: 2021-06-02

## 2021-06-02 ENCOUNTER — VIRTUAL VISIT (OUTPATIENT)
Dept: CARDIOLOGY CLINIC | Age: 86
End: 2021-06-02
Payer: MEDICARE

## 2021-06-02 DIAGNOSIS — I10 ESSENTIAL HYPERTENSION: Primary | ICD-10-CM

## 2021-06-02 DIAGNOSIS — I48.20 CHRONIC ATRIAL FIBRILLATION (HCC): ICD-10-CM

## 2021-06-02 PROCEDURE — 1090F PRES/ABSN URINE INCON ASSESS: CPT | Performed by: CLINICAL NURSE SPECIALIST

## 2021-06-02 PROCEDURE — G8427 DOCREV CUR MEDS BY ELIG CLIN: HCPCS | Performed by: CLINICAL NURSE SPECIALIST

## 2021-06-02 PROCEDURE — 4040F PNEUMOC VAC/ADMIN/RCVD: CPT | Performed by: CLINICAL NURSE SPECIALIST

## 2021-06-02 PROCEDURE — 99442 PR PHYS/QHP TELEPHONE EVALUATION 11-20 MIN: CPT | Performed by: CLINICAL NURSE SPECIALIST

## 2021-06-02 PROCEDURE — 1123F ACP DISCUSS/DSCN MKR DOCD: CPT | Performed by: CLINICAL NURSE SPECIALIST

## 2021-06-02 NOTE — PROGRESS NOTES
Elieser Tse is a 80 y.o. female evaluated via telephone on 6/2/2021. Consent:  She and/or health care decision maker is aware that that she may receive a bill for this telephone service, depending on her insurance coverage, and has provided verbal consent to proceed: Yes      Documentation:  I communicated with the patient and/or health care decision maker about HTN. Details of this discussion including any medical advice provided:       I affirm this is a Patient Initiated Episode with an Established Patient who has not had a related appointment within my department in the past 7 days or scheduled within the next 24 hours. Total Time: minutes: 11-20 minutes    Note: not billable if this call serves to triage the patient into an appointment for the relevant concern      JONATHAN Lamar     Elieser Tse is a 80 y.o. female with the following history as recorded in [facility]:    History of dyslipidemia, diabetes, hypertension and chronic atrial fibrillation with rate control. She is remain anticoagulated on Eliquis. Hypertension has been difficult to control and has been seen recently for blood pressure management. Phone messaging yesterday regarding her blood pressure. She was taking her to 0.3 clonidine as needed instead of the 0.1. She states her blood pressure was spiking greater than 200. Recommended increasing her losartan to 50 mg daily as well as keeping her carvedilol at 25 mg twice a day and indapamide 2.5 daily in the morning    Called today and concerned with blood pressure continue to be labile. Phone conversation conducted with her as well as family member on speaker. Patient reports taking her carvedilol and indapamide as directed. Yesterday afternoon she took 50 mg of the losartan. Checked her blood pressure and it was 151/80. Took a 0.1 clonidine. 20 minutes later it was 175/81 and she took another clonidine.   Few hours later it was higher so she went ahead and took her 0.3 mg of her clonidine. This morning she states her blood pressure was 147/70 and then went to 160. She went ahead and took another clonidine. By 11:00 her blood pressure was 105    Patient is concerned she is going to have a stroke.   She denies any chest pain or shortness of breath    Patient Active Problem List    Diagnosis Date Noted    Essential hypertension 10/12/2018    Dyslipidemia 10/12/2018    Type 2 diabetes mellitus ([facility] 10/12/2018    Hiatal hernia 10/12/2018    GERD (gastroesophageal reflux disease) 10/12/2018    Hyperthyroidism 10/12/2018    Ulcerative colitis ([facility] 10/12/2018    Anxiety 10/12/2018    Depression 10/12/2018    Osteoarthritis 10/12/2018     Current Outpatient Medications   Medication Sig Dispense Refill    losartan (COZAAR) 50 MG tablet Take 1 tablet by mouth daily 90 tablet 3    cloNIDine (CATAPRES) 0.1 MG tablet Take 1 tablet by mouth as needed for High Blood Pressure 90 tablet 3    apixaban (ELIQUIS) 5 MG TABS tablet TAKE ONE TABLET BY MOUTH 2 TIMES A  tablet 3    carvedilol (COREG) 25 MG tablet TAKE 1 TABLET TWICE DAILY WITH MEALS 180 tablet 0    cloNIDine (CATAPRES) 0.3 MG tablet TAKE 1 TABLET DAILY AT NIGHT 60 tablet 5    loperamide (RA ANTI-DIARRHEAL) 2 MG capsule Take 2 mg by mouth 4 times daily as needed for Diarrhea      potassium chloride (KLOR-CON) 20 MEQ packet Take 20 mEq by mouth daily      ferrous sulfate 325 (65 Fe) MG tablet Take 325 mg by mouth nightly       vitamin B-12 (CYANOCOBALAMIN) 1000 MCG tablet Take 1,000 mcg by mouth daily      indapamide (LOZOL) 2.5 MG tablet Take 2.5 mg by mouth every morning      pravastatin (PRAVACHOL) 40 MG tablet Take 40 mg by mouth daily      omeprazole (PRILOSEC) 20 MG delayed release capsule Take 20 mg by mouth nightly       metFORMIN (GLUCOPHAGE) 500 MG tablet Take 1,000 mg by mouth 2 times daily (with meals)       levothyroxine (SYNTHROID) 100 MCG tablet Take 100 mcg by mouth Daily      digoxin (LANOXIN) 125 MCG tablet Take 125 mcg by mouth daily      vitamin D 1000 units CAPS Take by mouth daily       No current facility-administered medications for this visit. Allergies: Altace [ramipril], Augmentin [amoxicillin-pot clavulanate], Biaxin [clarithromycin], Codeine, Keflex [cephalexin], Paxil [paroxetine hcl], and Sulfa antibiotics  Past Medical History:   Diagnosis Date    [facility]     [facility]     Eczema     GERD (gastroesophageal reflux disease)     Hyperlipidemia     Hypertension     Osteoarthritis     Thyroid disease      Past Surgical History:   Procedure Laterality Date    JOINT REPLACEMENT Right 2013    SHOULDER SURGERY Left 2006     History reviewed. No pertinent family history. Social History     Tobacco Use    Smoking status: Never Smoker    Smokeless tobacco: Never Used   Substance Use Topics    Alcohol use: No        Diagnosis Orders   1. Essential hypertension     2. Chronic atrial fibrillation (HCC)       Long conversation regarding medications timing and how they work. We discussed not checking her blood pressure quite so often she was checking every 20 minutes yesterday. We discussed the role anxiety plays in blood pressure management and assured her that we would get control of her blood pressure but may take some time and medications adjustments need about a week to see where she is going to land    Again we will have her continue her carvedilol 25 mg twice a day. Her Lozol 2.5 mg in the morning as well as losartan now 50 mg daily. She is to continue her clonidine 0.3 mg at bedtime. We discussed checking her blood pressure once in the afternoon and if greater than 160/100 may take a 0.1 mg clonidine. We discussed ranges to report to the emergency room if she develops any strokelike symptoms    Instructed to keep a log and how often she takes her clonidine.   We will do a phone visit next week to reassess and may require further up titration

## 2021-06-04 ENCOUNTER — TELEPHONE (OUTPATIENT)
Dept: CARDIOLOGY CLINIC | Age: 86
End: 2021-06-04

## 2021-06-04 NOTE — TELEPHONE ENCOUNTER
I called patient back and went over your advisement. I told her not to check her BP until noon and only take clonidine if it's 160/100. She said, \"Well 158 is only 2 points away from 160. \" Advised again not to take unless it's over 160/100. Advised that her medications were just changed yesterday and it will take time for those to work. \"Well what I am supposed to do when after I take a clonidine and it's 186/96 after I have rested for an hour, what do you think about that? \" I advised that her BP elevation can be due to anxiety. \"Well what happens when I have a stroke, I guess that will be a different kind of anxiety wont it. Maybe I should just take one of those pills they gave my  before he \" Advised not to take anything we don't prescribe for her. Patient was not receptive to what I was trying to tell her. She is fixated on her blood pressure and having a stroke.

## 2021-06-04 NOTE — TELEPHONE ENCOUNTER
If she calls back tell her we will make no further adjustments unless she is seen in the office and she needs to bring a family member with her

## 2021-06-04 NOTE — TELEPHONE ENCOUNTER
She is not following directions. She is only to take her clonidine if her blood pressure is greater than 160/100. Again reiterates that she is not following directions. We cannot help her if she does not go with my guidelines. A spike of 150s is not considered worrisome. When I did my phone visit with her yesterday there was someone else in the room I believe a family member.   They may need to be contacted to help her with her medication

## 2021-06-04 NOTE — TELEPHONE ENCOUNTER
What is the maximum amount she can take in a day and how long should she wait before doses if allowed more than one per day. Her Rx doesn't show any specific parameters, it states take PRN for HTN and they gave her 80.

## 2021-06-07 ENCOUNTER — TELEPHONE (OUTPATIENT)
Dept: CARDIOLOGY CLINIC | Age: 86
End: 2021-06-07

## 2021-06-09 ENCOUNTER — VIRTUAL VISIT (OUTPATIENT)
Dept: CARDIOLOGY CLINIC | Age: 86
End: 2021-06-09
Payer: MEDICARE

## 2021-06-09 DIAGNOSIS — I48.20 CHRONIC ATRIAL FIBRILLATION (HCC): ICD-10-CM

## 2021-06-09 DIAGNOSIS — I35.0 SEVERE AORTIC STENOSIS: ICD-10-CM

## 2021-06-09 DIAGNOSIS — I10 ESSENTIAL HYPERTENSION: Primary | ICD-10-CM

## 2021-06-09 PROCEDURE — 99442 PR PHYS/QHP TELEPHONE EVALUATION 11-20 MIN: CPT | Performed by: CLINICAL NURSE SPECIALIST

## 2021-06-09 PROCEDURE — 4040F PNEUMOC VAC/ADMIN/RCVD: CPT | Performed by: CLINICAL NURSE SPECIALIST

## 2021-06-09 PROCEDURE — G8427 DOCREV CUR MEDS BY ELIG CLIN: HCPCS | Performed by: CLINICAL NURSE SPECIALIST

## 2021-06-09 PROCEDURE — 1123F ACP DISCUSS/DSCN MKR DOCD: CPT | Performed by: CLINICAL NURSE SPECIALIST

## 2021-06-09 PROCEDURE — 1090F PRES/ABSN URINE INCON ASSESS: CPT | Performed by: CLINICAL NURSE SPECIALIST

## 2021-06-09 RX ORDER — LOSARTAN POTASSIUM 50 MG/1
50 TABLET ORAL 2 TIMES DAILY
Qty: 180 TABLET | Refills: 3 | Status: SHIPPED | OUTPATIENT
Start: 2021-06-09 | End: 2021-07-19 | Stop reason: SDUPTHER

## 2021-06-09 NOTE — PROGRESS NOTES
Trista Sanchez is a 80 y.o. female evaluated via telephone on 6/9/2021. Consent:  She and/or health care decision maker is aware that that she may receive a bill for this telephone service, depending on her insurance coverage, and has provided verbal consent to proceed: Yes      Documentation:  I communicated with the patient and/or health care decision maker about hypertension. Details of this discussion including any medical advice provided:       I affirm this is a Patient Initiated Episode with an Established Patient who has not had a related appointment within my department in the past 7 days or scheduled within the next 24 hours. Total Time: minutes: 11-20 minutes    Note: not billable if this call serves to triage the patient into an appointment for the relevant concern      JONATHAN Heaton     Trista Sanchez is a 80 y.o. female with the following history as recorded in [facility]:  Patient had been having issues with hypertension. Had phone visit 6/2/21 after multiple phone calls to the office regarding hypertension and self medication adjustments    Patient complained of elevated blood pressure and taking extra clonidine. Spoke with patient and her family member present. Uptitrated her losartan from 25 mg to 50 mg daily. Continued her carvedilol and indapamide and had her take her blood pressure only once in the afternoon with supplementing clonidine 0.1 mg if blood pressure > 160/100. Instructed to keep blood pressure log and how often she was taking her clonidine. Patient phoned 2 days later complaining of blood pressure still elevated in taking the clonidine. Directed not to take more than 3 of the 0.1 mg of the clonidine in a day. Follow-up phone visit today conducted to readdress hypertension concerns. Patient reports she sets her alarm at 6 AM to wake up and take her morning medications. She usually will go back to bed.   She states her blood pressure is sometimes a (PRAVACHOL) 40 MG tablet Take 40 mg by mouth daily      omeprazole (PRILOSEC) 20 MG delayed release capsule Take 20 mg by mouth nightly       metFORMIN (GLUCOPHAGE) 500 MG tablet Take 1,000 mg by mouth 2 times daily (with meals)       levothyroxine (SYNTHROID) 100 MCG tablet Take 100 mcg by mouth Daily      digoxin (LANOXIN) 125 MCG tablet Take 125 mcg by mouth daily      vitamin D 1000 units CAPS Take by mouth daily       No current facility-administered medications for this visit. Allergies: Altace [ramipril], Augmentin [amoxicillin-pot clavulanate], Biaxin [clarithromycin], Codeine, Keflex [cephalexin], Paxil [paroxetine hcl], and Sulfa antibiotics  Past Medical History:   Diagnosis Date    [facility]     [facility]     Eczema     GERD (gastroesophageal reflux disease)     Hyperlipidemia     Hypertension     Osteoarthritis     Thyroid disease      Past Surgical History:   Procedure Laterality Date    JOINT REPLACEMENT Right 2013    SHOULDER SURGERY Left 2006     History reviewed. No pertinent family history. Social History     Tobacco Use    Smoking status: Never Smoker    Smokeless tobacco: Never Used   Substance Use Topics    Alcohol use: No        Diagnosis Orders   1. Essential hypertension     2. Chronic atrial fibrillation (HCC)     3. Severe aortic stenosis       Again we discussed normal blood pressure readings and its typical for her to be a little elevated in the morning. We discussed not stopping her clonidine so close together in the afternoon. We discussed taking her twice daily medications approximately 12 hours apart and not having to set an alarm to get up at 6 AM  Encouraged her to be as active as possible and to not check her blood pressure quite so often. Believe some anxiety with this is contributing    We will increase her losartan to 50 mg twice a day with taking her second dose early afternoon to help with the late afternoon spikes.   She can still take the clonidine but recommended not taking within an hour or 2 of the previous dose so not to drop her blood pressure so low. This balancing is probably contributing to her feeling poorly at times    Again reiterated following directions. Daughter is on phone call and is listening. She reinforced this as well. Also we discussed limiting sodium in diet and staying hydrated    30 Minutes were spent speaking with patient and discussing plan of care. Follow-up will be made in 2 weeks . Someone from the  will call and set up appointment.     Electronically signed, JONATHAN Ocampo

## 2021-06-09 NOTE — TELEPHONE ENCOUNTER
Patient has appointment today. Will not be able to contact due to clinic at [facility]. Patient will need to address in visit today.

## 2021-06-14 ENCOUNTER — TELEPHONE (OUTPATIENT)
Dept: CARDIOLOGY CLINIC | Age: 86
End: 2021-06-14

## 2021-06-14 NOTE — TELEPHONE ENCOUNTER
I called the patient to make her follow up visit. She asked that Kesha Monroe know she doesn't believe the medication is working. She stated her readings are still high. She is unsure if the medication should have worked by now. Please call and advise.

## 2021-06-23 ENCOUNTER — VIRTUAL VISIT (OUTPATIENT)
Dept: CARDIOLOGY CLINIC | Age: 86
End: 2021-06-23
Payer: MEDICARE

## 2021-06-23 DIAGNOSIS — I10 ESSENTIAL HYPERTENSION: Primary | ICD-10-CM

## 2021-06-23 DIAGNOSIS — I48.20 CHRONIC ATRIAL FIBRILLATION (HCC): ICD-10-CM

## 2021-06-23 PROCEDURE — 1090F PRES/ABSN URINE INCON ASSESS: CPT | Performed by: CLINICAL NURSE SPECIALIST

## 2021-06-23 PROCEDURE — 4040F PNEUMOC VAC/ADMIN/RCVD: CPT | Performed by: CLINICAL NURSE SPECIALIST

## 2021-06-23 PROCEDURE — 1123F ACP DISCUSS/DSCN MKR DOCD: CPT | Performed by: CLINICAL NURSE SPECIALIST

## 2021-06-23 PROCEDURE — 99442 PR PHYS/QHP TELEPHONE EVALUATION 11-20 MIN: CPT | Performed by: CLINICAL NURSE SPECIALIST

## 2021-06-23 PROCEDURE — G8428 CUR MEDS NOT DOCUMENT: HCPCS | Performed by: CLINICAL NURSE SPECIALIST

## 2021-06-23 NOTE — PROGRESS NOTES
Sydney Aguilar is a 80 y.o. female evaluated via telephone on 6/23/2021. Consent:  She and/or health care decision maker is aware that that she may receive a bill for this telephone service, depending on her insurance coverage, and has provided verbal consent to proceed: Yes      Documentation:  I communicated with the patient and/or health care decision maker about hypertension. Details of this discussion including any medical advice provided:       I affirm this is a Patient Initiated Episode with an Established Patient who has not had a related appointment within my department in the past 7 days or scheduled within the next 24 hours. Total Time: minutes: 11-20 minutes    Note: not billable if this call serves to triage the patient into an appointment for the relevant concern      JONATHAN Helm     Sydney Aguilar is a 80 y.o. female with the following history as recorded in [facility]:  History of chronic atrial fibrillation with rate control and anticoagulation   Patient had been having issues with hypertension. Had phone visit 6/2/21 after multiple phone calls to the office regarding hypertension and self medication adjustments  Uptitrated her losartan to 50 mg twice a day -encouraged her to take her second dose in early afternoon to help with afternoon blood pressure spikes   as well as reinforced when and how to take clonidine as needed. Patient has historically taken her blood pressure multiple times throughout the day. Phone visit was conducted with daughter present to reinforce directions. She reports she has been taking her blood pressure in the morning  AM pressures 120-130/60s  4-5 pm will spike fiber she states 3 days she did not have to take an extra clonidine. Overall she is feeling better. Denies any chest pain. Staying active. Taking her medications as directed. She has developed a dry cough occasionally. Will discuss with her primary care doctor at next visit. Patient Active Problem List    Diagnosis Date Noted    Essential hypertension 10/12/2018    Dyslipidemia 10/12/2018    Type 2 diabetes mellitus ([facility] 10/12/2018    Hiatal hernia 10/12/2018    GERD (gastroesophageal reflux disease) 10/12/2018    Hyperthyroidism 10/12/2018    Ulcerative colitis ([facility] 10/12/2018    Anxiety 10/12/2018    Depression 10/12/2018    Osteoarthritis 10/12/2018     Current Outpatient Medications   Medication Sig Dispense Refill    losartan (COZAAR) 50 MG tablet Take 1 tablet by mouth 2 times daily 180 tablet 3    cloNIDine (CATAPRES) 0.1 MG tablet Take 1 tablet by mouth as needed for High Blood Pressure 90 tablet 3    apixaban (ELIQUIS) 5 MG TABS tablet TAKE ONE TABLET BY MOUTH 2 TIMES A  tablet 3    carvedilol (COREG) 25 MG tablet TAKE 1 TABLET TWICE DAILY WITH MEALS 180 tablet 0    cloNIDine (CATAPRES) 0.3 MG tablet TAKE 1 TABLET DAILY AT NIGHT 60 tablet 5    loperamide (RA ANTI-DIARRHEAL) 2 MG capsule Take 2 mg by mouth 2 times daily as needed for Diarrhea       potassium chloride (KLOR-CON) 20 MEQ packet Take 20 mEq by mouth daily      ferrous sulfate 325 (65 Fe) MG tablet Take 325 mg by mouth nightly       vitamin B-12 (CYANOCOBALAMIN) 1000 MCG tablet Take 1,000 mcg by mouth daily      indapamide (LOZOL) 2.5 MG tablet Take 2.5 mg by mouth every morning      pravastatin (PRAVACHOL) 40 MG tablet Take 40 mg by mouth daily      omeprazole (PRILOSEC) 20 MG delayed release capsule Take 20 mg by mouth nightly       metFORMIN (GLUCOPHAGE) 500 MG tablet Take 1,000 mg by mouth 2 times daily (with meals)       levothyroxine (SYNTHROID) 100 MCG tablet Take 100 mcg by mouth Daily      digoxin (LANOXIN) 125 MCG tablet Take 125 mcg by mouth daily      vitamin D 1000 units CAPS Take by mouth daily       No current facility-administered medications for this visit.      Allergies: Altace [ramipril], Augmentin [amoxicillin-pot clavulanate], Biaxin [clarithromycin], Codeine, Keflex [cephalexin], Paxil [paroxetine hcl], and Sulfa antibiotics  Past Medical History:   Diagnosis Date    [facility]ib ([facility]     Diabetes mellitus ([facility]     Eczema     GERD (gastroesophageal reflux disease)     Hyperlipidemia     Hypertension     Osteoarthritis     Thyroid disease      Past Surgical History:   Procedure Laterality Date    JOINT REPLACEMENT Right 2013    SHOULDER SURGERY Left 2006     History reviewed. No pertinent family history. Social History     Tobacco Use    Smoking status: Never Smoker    Smokeless tobacco: Never Used   Substance Use Topics    Alcohol use: No        Diagnosis Orders   1. Essential hypertension     2. Chronic atrial fibrillation ([facility]       Encouraged her to monitor her blood pressure however not take excessively. Overall blood pressure is better controlled with increasing the losartan to 50 mg twice a day. She can still take extra clonidine 0.1 mg for blood pressure spikes greater than 160/100. Continue other medications. We will keep her follow-up in 3 months    20 Minutes were spent speaking with patient and discussing plan of care. Follow-up will be made in 3 mos. Someone from the  will call and set up appointment.     Electronically signed, JONATHAN Fernandez

## 2021-07-16 DIAGNOSIS — I10 ESSENTIAL HYPERTENSION: ICD-10-CM

## 2021-07-19 RX ORDER — LOSARTAN POTASSIUM 50 MG/1
50 TABLET ORAL 2 TIMES DAILY
Qty: 180 TABLET | Refills: 3 | Status: SHIPPED | OUTPATIENT
Start: 2021-07-19 | End: 2022-10-27

## 2021-07-28 DIAGNOSIS — I48.20 CHRONIC ATRIAL FIBRILLATION (HCC): ICD-10-CM

## 2021-07-28 DIAGNOSIS — I10 ESSENTIAL HYPERTENSION: ICD-10-CM

## 2021-07-29 RX ORDER — CARVEDILOL 25 MG/1
TABLET ORAL
Qty: 180 TABLET | Refills: 1 | Status: SHIPPED | OUTPATIENT
Start: 2021-07-29 | End: 2022-02-01 | Stop reason: SDUPTHER

## 2021-09-13 RX ORDER — CLONIDINE HYDROCHLORIDE 0.3 MG/1
TABLET ORAL
Qty: 90 TABLET | Refills: 3 | Status: SHIPPED | OUTPATIENT
Start: 2021-09-13 | End: 2021-12-14

## 2021-09-27 ENCOUNTER — OFFICE VISIT (OUTPATIENT)
Dept: CARDIOLOGY CLINIC | Age: 86
End: 2021-09-27
Payer: MEDICARE

## 2021-09-27 VITALS
HEIGHT: 62 IN | WEIGHT: 152 LBS | SYSTOLIC BLOOD PRESSURE: 124 MMHG | HEART RATE: 94 BPM | DIASTOLIC BLOOD PRESSURE: 74 MMHG | OXYGEN SATURATION: 96 % | BODY MASS INDEX: 27.97 KG/M2

## 2021-09-27 DIAGNOSIS — I10 ESSENTIAL HYPERTENSION: Primary | ICD-10-CM

## 2021-09-27 DIAGNOSIS — I48.20 CHRONIC ATRIAL FIBRILLATION (HCC): ICD-10-CM

## 2021-09-27 PROCEDURE — 4040F PNEUMOC VAC/ADMIN/RCVD: CPT | Performed by: CLINICAL NURSE SPECIALIST

## 2021-09-27 PROCEDURE — G8417 CALC BMI ABV UP PARAM F/U: HCPCS | Performed by: CLINICAL NURSE SPECIALIST

## 2021-09-27 PROCEDURE — 99214 OFFICE O/P EST MOD 30 MIN: CPT | Performed by: CLINICAL NURSE SPECIALIST

## 2021-09-27 PROCEDURE — 1036F TOBACCO NON-USER: CPT | Performed by: CLINICAL NURSE SPECIALIST

## 2021-09-27 PROCEDURE — 1090F PRES/ABSN URINE INCON ASSESS: CPT | Performed by: CLINICAL NURSE SPECIALIST

## 2021-09-27 PROCEDURE — G8427 DOCREV CUR MEDS BY ELIG CLIN: HCPCS | Performed by: CLINICAL NURSE SPECIALIST

## 2021-09-27 PROCEDURE — 1123F ACP DISCUSS/DSCN MKR DOCD: CPT | Performed by: CLINICAL NURSE SPECIALIST

## 2021-09-27 RX ORDER — MAGNESIUM OXIDE 400 MG/1
400 TABLET ORAL DAILY
COMMUNITY

## 2021-09-27 NOTE — PROGRESS NOTES
Osteoarthritis     Thyroid disease      Past Surgical History:   Procedure Laterality Date    JOINT REPLACEMENT Right 2013    SHOULDER SURGERY Left 2006     History reviewed. No pertinent family history. Social History     Tobacco Use    Smoking status: Never Smoker    Smokeless tobacco: Never Used   Substance Use Topics    Alcohol use: No      Current Outpatient Medications   Medication Sig Dispense Refill    magnesium oxide (MAG-OX) 400 MG tablet Take 400 mg by mouth daily      cloNIDine (CATAPRES) 0.3 MG tablet TAKE 1 TABLET DAILY AT NIGHT 90 tablet 3    carvedilol (COREG) 25 MG tablet TAKE 1 TABLET TWICE DAILY WITH MEALS 180 tablet 1    losartan (COZAAR) 50 MG tablet Take 1 tablet by mouth 2 times daily 180 tablet 3    cloNIDine (CATAPRES) 0.1 MG tablet Take 1 tablet by mouth as needed for High Blood Pressure 90 tablet 3    apixaban (ELIQUIS) 5 MG TABS tablet TAKE ONE TABLET BY MOUTH 2 TIMES A  tablet 3    loperamide (RA ANTI-DIARRHEAL) 2 MG capsule Take 2 mg by mouth 2 times daily as needed for Diarrhea       potassium chloride (KLOR-CON) 20 MEQ packet Take 20 mEq by mouth daily      ferrous sulfate 325 (65 Fe) MG tablet Take 325 mg by mouth nightly       vitamin B-12 (CYANOCOBALAMIN) 1000 MCG tablet Take 1,000 mcg by mouth daily      indapamide (LOZOL) 2.5 MG tablet Take 2.5 mg by mouth every morning      pravastatin (PRAVACHOL) 40 MG tablet Take 40 mg by mouth daily      omeprazole (PRILOSEC) 20 MG delayed release capsule Take 20 mg by mouth nightly       metFORMIN (GLUCOPHAGE) 500 MG tablet Take 1,000 mg by mouth 2 times daily (with meals)       levothyroxine (SYNTHROID) 100 MCG tablet Take 100 mcg by mouth Daily      digoxin (LANOXIN) 125 MCG tablet Take 125 mcg by mouth daily      vitamin D 1000 units CAPS Take by mouth daily       No current facility-administered medications for this visit.      Allergies: Altace [ramipril], Augmentin [amoxicillin-pot clavulanate], Biaxin [clarithromycin], Codeine, Keflex [cephalexin], Paxil [paroxetine hcl], and Sulfa antibiotics    Review of Systems  Review of Systems   Constitutional: Positive for fatigue. Negative for activity change, diaphoresis, fever and unexpected weight change. HENT: Negative for facial swelling and nosebleeds. Eyes: Negative for redness and visual disturbance. Respiratory: Negative for cough, chest tightness, shortness of breath and wheezing. Cardiovascular: Negative for chest pain, palpitations and leg swelling. C/o BP up and down   Gastrointestinal: Negative for abdominal pain, nausea and vomiting. Endocrine: Negative for cold intolerance and heat intolerance. Genitourinary: Negative for dysuria and hematuria. Musculoskeletal: Negative for arthralgias and myalgias. Complains of mobility issues   Skin: Negative for pallor and rash. Neurological: Negative for dizziness, seizures, syncope, weakness and light-headedness. Hematological: Does not bruise/bleed easily. Psychiatric/Behavioral: Negative for agitation. The patient is not nervous/anxious. Objective  Vital Signs - /74   Pulse 94   Ht 5' 2\" (1.575 m)   Wt 152 lb (68.9 kg)   SpO2 96%   BMI 27.80 kg/m²   Physical Exam  Vitals and nursing note reviewed. Constitutional:       General: She is not in acute distress. Appearance: She is well-developed. She is not diaphoretic. Comments: Deconditioned   HENT:      Head: Normocephalic and atraumatic. Right Ear: Hearing and external ear normal.      Left Ear: Hearing and external ear normal.      Nose: Nose normal.   Eyes:      General:         Right eye: No discharge. Left eye: No discharge. Pupils: Pupils are equal, round, and reactive to light. Neck:      Thyroid: No thyromegaly. Vascular: No carotid bruit or JVD. Trachea: No tracheal deviation. Cardiovascular:      Rate and Rhythm: Normal rate. Rhythm irregular.       Heart sounds: Normal heart sounds. No murmur heard. No friction rub. No gallop. Pulmonary:      Effort: Pulmonary effort is normal. No respiratory distress. Breath sounds: Normal breath sounds. No wheezing or rales. Abdominal:      Palpations: Abdomen is soft. Tenderness: There is no abdominal tenderness. Musculoskeletal:         General: No swelling or deformity. Cervical back: Neck supple. No muscular tenderness. Right lower leg: Edema (trace) present. Left lower leg: Edema (trace) present. Comments: Ambulates with walker   Skin:     General: Skin is warm and dry. Findings: No rash. Neurological:      General: No focal deficit present. Mental Status: She is alert and oriented to person, place, and time. Cranial Nerves: No cranial nerve deficit. Psychiatric:         Mood and Affect: Mood normal.         Behavior: Behavior normal.         Judgment: Judgment normal.         Data:  Echo 11/13/19  Summary   Mitral valve leaflets are mildly thickened with preserved leaflet   mobility. Mild mitral annular calcification is present. No evidence of mitral valve stenosis. Mild to moderate mitral regurgitation is present. Mild thickening with preserved cusp separation. Aortic valve appears to be tricuspid. No evidence of aortic stenosis or aortic regurgitation. Tricuspid valve is structurally normal.   No evidence of tricuspid stenosis. Mild tricuspid regurgitation noted. Mildly dilated left atrium. Normal left ventricular size with preserved LV function and an estimated   ejection fraction of approximately 55-60%. No evidence of left ventricular mass or thrombus noted. No regional wall motion abnormalities noted. Restrictive filling pattern. Mildly dilated right atrial dimension with no evidence of thrombus or mass   noted. Normal right ventricular size with preserved RV function. The right ventricular systolic pressure is 33 mmHg.       Signature ----------------------------------------------------------------   Electronically signed by Flory Horn MD(Interpreting   physician) on 11/13/2019 05:42 PM   ----------------------------------------------------------------       Assessment:     Diagnosis Orders   1. Essential hypertension     2. Chronic atrial fibrillation (HCC)         Hypertensionvery difficult to control. She agreed to delaying her second dose of losartan as her blood pressure is generally low at 1 PM.  I have encouraged her to wait until 3-4pm to take this medication. She can use clonidine 0.1 mg as needed for BP greater than 160/100, although she feels it is not effective, I have encouraged her to use it if needed. Chronic atrial fibrillationrate controlled with digoxin and carvedilol. Her PCP monitors labs. I will request him to be sure she has had a digoxin level and a BMP to assess her kidney function. With her age she may be a candidate to reduce her Eliquis dosage. Stable cardiovascular status. No evidence of overt heart failure,angina or dysrhythmia. Plan    Return in about 6 months (around 3/27/2022) for Dr. Madalyn Brewer, as scheduled. Take second dose of Losartan 3-4 pm  Clonidine 0.1mg for BP > 160/100  Will request labs from PCP office    Call with any questionsor concerns  Follow up with Blanca Valdez MD for non cardiac problems  Report any new problems  Cardiovascular Fitness-Exercise as tolerated. Strive for 15 minutes of exercise most days of the week. Cardiac / HealthyDiet  Continue current medications as directed  Continue plan of treatment  It is always recommended that you bring your medicationsbottles with you to each visit - this is for your safety!        JONATHAN Siegel

## 2021-09-27 NOTE — PATIENT INSTRUCTIONS
Return in about 6 months (around 3/27/2022) for Dr. Ramila Rogers, as scheduled. Take second dose of Losartan 3-4 pm  Clonidine 0.1mg for BP > 160/100  Will request labs from PCP office      Patient Education        Learning About Atrial Fibrillation  What is atrial fibrillation? Atrial fibrillation (say \"LATRICIA-tree-lizandro vhl-yira-SMW-shun\") is a common type of irregular heartbeat (arrhythmia). Normally, the heart beats in a strong, steady rhythm. In atrial fibrillation, a problem with the heart's electrical system causes the two upper chambers of the heart (called the atria) to quiver, or fibrillate. Atrial fibrillation can be dangerous. This is because if the heartbeat isn't strong and steady, blood can collect, or pool, in the atria. And pooled blood is more likely to form clots. Clots can travel to the brain, block blood flow, and cause a stroke. Atrial fibrillation can also lead to heart failure. This condition also upsets the normal rhythm between the atria and the lower chambers of the heart. (These chambers are called the ventricles.) The ventricles may beat fast and without a regular rhythm. What are the symptoms? Some people feel symptoms when they have episodes of atrial fibrillation. But other people don't notice any symptoms. If you have symptoms, you may feel:  · A fluttering, racing, or pounding feeling in your chest called palpitations. · Weak or tired. · Dizzy or lightheaded. · Short of breath. · Chest pain. · Confused. You may notice signs of atrial fibrillation when you check your pulse. Your pulse may seem uneven or fast.  What can you expect when you have atrial fibrillation? At first, spells of atrial fibrillation may come on suddenly and last a short time. They may go away on their own or with treatment. Over time, the spells may last longer and occur more often. They often don't go away on their own. How is it treated?   Treatments can help you feel better and prevent future problems, especially stroke and heart failure. Your treatment will depend on the cause of your atrial fibrillation, your symptoms, and your risk for stroke. Types of treatment include:  · Heart rate treatment. Medicine may be used to slow your heart rate. Your heartbeat may still be irregular. But these medicines keep your heart from beating too fast. They may also help relieve symptoms. · Heart rhythm treatment. Different treatments may be used to try to stop atrial fibrillation and keep it from returning. They can also relieve symptoms. These treatments include medicine, electrical cardioversion to shock the heart back to a normal rhythm, a procedure called catheter ablation, and heart surgery. · Stroke prevention. You and your doctor can decide how to lower your risk. You may decide to take a blood-thinning medicine called an anticoagulant. What is a heart-healthy lifestyle for atrial fibrillation? You can live well and help manage atrial fibrillation by having a heart-healthy lifestyle. This lifestyle may help reduce how often you have episodes of atrial fibrillation. Don't smoke. Avoid secondhand smoke too. Quitting smoking is the best thing you can do for your heart. Be active. Talk to your doctor about what type and level of exercise is safe for you. Eat a heart-healthy diet. These foods include vegetables, fruits, nuts, beans, lean meat, fish, and whole grains. Limit sodium, alcohol, and sugar. Avoid alcohol if it triggers symptoms. Stay at a healthy weight. Lose weight if you need to. Losing weight can help relieve symptoms. Manage other health problems. These problems include diabetes, high blood pressure, and high cholesterol. If you think you may have a problem with alcohol or drug use, talk to your doctor. Manage stress. Options like yoga, biofeedback, and meditation may help. Where can you learn more? Go to [email or web address] and sign in to your MyChart account. Enter L172 in the [facility] box to learn more about \"Learning About Atrial Fibrillation. \"     If you do not have an account, please click on the \"Sign Up Now\" link. Current as of: April 29, 2021               Content Version: 13.0  © 1512-3467 [employer]wise, Incorporated. Care instructions adapted under license by [facility] ([facility]). If you have questions about a medical condition or this instruction, always ask your healthcare professional. [address] any warranty or liability for your use of this information.

## 2021-09-28 ASSESSMENT — ENCOUNTER SYMPTOMS
COUGH: 0
WHEEZING: 0
SHORTNESS OF BREATH: 0
EYE REDNESS: 0
FACIAL SWELLING: 0
NAUSEA: 0
CHEST TIGHTNESS: 0
VOMITING: 0
ABDOMINAL PAIN: 0

## 2021-10-05 ENCOUNTER — TELEPHONE (OUTPATIENT)
Dept: CARDIOLOGY CLINIC | Age: 86
End: 2021-10-05

## 2021-10-05 NOTE — TELEPHONE ENCOUNTER
I had a reminder today to check to see if we received labs on this patient and we have not. I am looking for digoxin level, BMP, lipids from Dr. Sal Taylor in [place]. If you could please track these down for me.   Thank you

## 2021-10-06 NOTE — TELEPHONE ENCOUNTER
Called the office of norman ross in [place] to get these labs faxed to us.   carmen informed me she would fax them over to us today

## 2021-10-07 DIAGNOSIS — I48.20 CHRONIC ATRIAL FIBRILLATION (HCC): Primary | ICD-10-CM

## 2021-10-07 DIAGNOSIS — E78.2 MIXED HYPERLIPIDEMIA: ICD-10-CM

## 2021-11-08 ENCOUNTER — OFFICE VISIT (OUTPATIENT)
Dept: CARDIOLOGY CLINIC | Age: 86
End: 2021-11-08
Payer: MEDICARE

## 2021-11-08 VITALS
HEART RATE: 87 BPM | SYSTOLIC BLOOD PRESSURE: 150 MMHG | HEIGHT: 62 IN | DIASTOLIC BLOOD PRESSURE: 76 MMHG | WEIGHT: 149 LBS | BODY MASS INDEX: 27.42 KG/M2 | OXYGEN SATURATION: 96 %

## 2021-11-08 DIAGNOSIS — F41.9 ANXIETY: ICD-10-CM

## 2021-11-08 DIAGNOSIS — I10 ESSENTIAL HYPERTENSION: Primary | ICD-10-CM

## 2021-11-08 DIAGNOSIS — I48.20 CHRONIC ATRIAL FIBRILLATION (HCC): ICD-10-CM

## 2021-11-08 PROCEDURE — 1123F ACP DISCUSS/DSCN MKR DOCD: CPT | Performed by: CLINICAL NURSE SPECIALIST

## 2021-11-08 PROCEDURE — 99214 OFFICE O/P EST MOD 30 MIN: CPT | Performed by: CLINICAL NURSE SPECIALIST

## 2021-11-08 PROCEDURE — G8417 CALC BMI ABV UP PARAM F/U: HCPCS | Performed by: CLINICAL NURSE SPECIALIST

## 2021-11-08 PROCEDURE — 4040F PNEUMOC VAC/ADMIN/RCVD: CPT | Performed by: CLINICAL NURSE SPECIALIST

## 2021-11-08 PROCEDURE — 1036F TOBACCO NON-USER: CPT | Performed by: CLINICAL NURSE SPECIALIST

## 2021-11-08 PROCEDURE — 1090F PRES/ABSN URINE INCON ASSESS: CPT | Performed by: CLINICAL NURSE SPECIALIST

## 2021-11-08 PROCEDURE — G8484 FLU IMMUNIZE NO ADMIN: HCPCS | Performed by: CLINICAL NURSE SPECIALIST

## 2021-11-08 PROCEDURE — G8427 DOCREV CUR MEDS BY ELIG CLIN: HCPCS | Performed by: CLINICAL NURSE SPECIALIST

## 2021-11-08 RX ORDER — CITALOPRAM 10 MG/1
10 TABLET ORAL DAILY
COMMUNITY

## 2021-11-08 RX ORDER — AMLODIPINE BESYLATE 2.5 MG/1
2.5 TABLET ORAL DAILY
Qty: 180 TABLET | Refills: 3 | Status: SHIPPED | OUTPATIENT
Start: 2021-11-08 | End: 2021-11-09 | Stop reason: SDUPTHER

## 2021-11-08 ASSESSMENT — ENCOUNTER SYMPTOMS
CHEST TIGHTNESS: 0
WHEEZING: 0
VOMITING: 0
ABDOMINAL PAIN: 0
NAUSEA: 0
EYE REDNESS: 0
FACIAL SWELLING: 0
COUGH: 0
SHORTNESS OF BREATH: 0

## 2021-11-08 NOTE — PROGRESS NOTES
[facility] [address]  Phone: [phone]  Fax: [phone]    OFFICE VISIT:  11/8/2021    [address]: 4/2/1933    Reason For Visit:  Lillie Leon is a 80 y.o. female who is here for Follow-up (Patient has been having problems with BP being high.), Atrial Fibrillation, and Hypertension       Diagnosis Orders   1. Essential hypertension     2. Chronic atrial fibrillation (HCC)     3. Anxiety           HPI  Patient is here for follow-up today with a history of chronic atrial fibrillation. In recent months there have been more issues with uncontrolled hypertension. She did several virtual visits in June with JONATHAN Glass. She has problems with her blood pressure being up and down and she is very concerned about this. She checks her blood pressure multiple times per day. She gets very anxious when her blood pressure gets into the 150 range. She reports she went to the emergency room because her systolic blood pressure was over 200 recently. Current regimen consists of:    Carvedilol 25 mg twice a day  Clonidine 0.3 mg at bedtime and 0.1 mg as needed  Losartan 50 mg twice daily  Indapamide 2.5 mg daily    She is here today with her daughter. She is anxious and upset about her blood pressure and is easily flustered and upset. She denies chest pain or unusual dyspnea. She has issues with chronic fatigue and mobility issues    Her BP monitor reads about 6-8 points higher than our wall blood pressure cuff     Bibiana Nielson MD is PCP.   Dustinтатьяна Meckel has the following history as recorded in [facility]:    Patient Active Problem List    Diagnosis Date Noted    Essential hypertension 10/12/2018    Dyslipidemia 10/12/2018    Type 2 diabetes mellitus ([facility] [address] 10/12/2018    Hiatal hernia 10/12/2018    GERD (gastroesophageal reflux disease) 10/12/2018    Hyperthyroidism 10/12/2018    Ulcerative colitis ([address] 10/12/2018    Anxiety 10/12/2018    Depression 10/12/2018    Osteoarthritis 10/12/2018     Past Medical History:   Diagnosis Date    Afib ([facility] [address]     Diabetes mellitus ([facility] [address]     Eczema     GERD (gastroesophageal reflux disease)     Hyperlipidemia     Hypertension     Osteoarthritis     Thyroid disease      Past Surgical History:   Procedure Laterality Date    JOINT REPLACEMENT Right 2013    SHOULDER SURGERY Left 2006     History reviewed. No pertinent family history.   Social History     Tobacco Use    Smoking status: Never Smoker    Smokeless tobacco: Never Used   Substance Use Topics    Alcohol use: No      Current Outpatient Medications   Medication Sig Dispense Refill    citalopram (CELEXA) 10 MG tablet Take 10 mg by mouth daily      amLODIPine (NORVASC) 2.5 MG tablet Take 1 tablet by mouth daily 180 tablet 3    magnesium oxide (MAG-OX) 400 MG tablet Take 400 mg by mouth daily      cloNIDine (CATAPRES) 0.3 MG tablet TAKE 1 TABLET DAILY AT NIGHT 90 tablet 3    carvedilol (COREG) 25 MG tablet TAKE 1 TABLET TWICE DAILY WITH MEALS 180 tablet 1    losartan (COZAAR) 50 MG tablet Take 1 tablet by mouth 2 times daily 180 tablet 3    cloNIDine (CATAPRES) 0.1 MG tablet Take 1 tablet by mouth as needed for High Blood Pressure 90 tablet 3    apixaban (ELIQUIS) 5 MG TABS tablet TAKE ONE TABLET BY MOUTH 2 TIMES A  tablet 3    loperamide (RA ANTI-DIARRHEAL) 2 MG capsule Take 2 mg by mouth 2 times daily as needed for Diarrhea       potassium chloride (KLOR-CON) 20 MEQ packet Take 20 mEq by mouth daily      ferrous sulfate 325 (65 Fe) MG tablet Take 325 mg by mouth nightly       vitamin B-12 (CYANOCOBALAMIN) 1000 MCG tablet Take 1,000 mcg by mouth daily      indapamide (LOZOL) 2.5 MG tablet Take 2.5 mg by mouth every morning      pravastatin (PRAVACHOL) 40 MG tablet Take 40 mg by mouth daily      omeprazole (PRILOSEC) 20 MG delayed release capsule Take 20 mg by mouth nightly       metFORMIN (GLUCOPHAGE) 500 MG tablet Take 1,000 mg by mouth 2 times daily (with meals)       levothyroxine (SYNTHROID) 100 MCG tablet Take 100 mcg by mouth Daily      digoxin (LANOXIN) 125 MCG tablet Take 125 mcg by mouth daily      vitamin D 1000 units CAPS Take by mouth daily       No current facility-administered medications for this visit. Allergies: Altace [ramipril], Augmentin [amoxicillin-pot clavulanate], Biaxin [clarithromycin], Codeine, Keflex [cephalexin], Paxil [paroxetine hcl], and Sulfa antibiotics    Review of Systems  Review of Systems   Constitutional: Positive for fatigue. Negative for activity change, diaphoresis, fever and unexpected weight change. HENT: Negative for facial swelling and nosebleeds. Eyes: Negative for redness and visual disturbance. Respiratory: Negative for cough, chest tightness, shortness of breath and wheezing. Cardiovascular: Negative for chest pain, palpitations and leg swelling. C/o BP up and down   Gastrointestinal: Negative for abdominal pain, nausea and vomiting. Endocrine: Negative for cold intolerance and heat intolerance. Genitourinary: Negative for dysuria and hematuria. Musculoskeletal: Negative for arthralgias and myalgias. Complains of mobility issues   Skin: Negative for pallor and rash. Neurological: Negative for dizziness, seizures, syncope, weakness and light-headedness. Hematological: Does not bruise/bleed easily. Psychiatric/Behavioral: Negative for agitation. The patient is not nervous/anxious. Objective  Vital Signs - BP (!) 150/76   Pulse 87   Ht 5' 2\" (1.575 m)   Wt 149 lb (67.6 kg)   SpO2 96%   BMI 27.25 kg/m²   Physical Exam  Vitals and nursing note reviewed. Constitutional:       General: She is not in acute distress. Appearance: She is well-developed. She is not diaphoretic. Comments: Deconditioned   HENT:      Head: Normocephalic and atraumatic.       Right Ear: Hearing and external ear normal.      Left Ear: Hearing and external ear normal.      Nose: Nose normal.   Eyes:      General:         Right eye: No discharge. Left eye: No discharge. Pupils: Pupils are equal, round, and reactive to light. Neck:      Thyroid: No thyromegaly. Vascular: No carotid bruit or JVD. Trachea: No tracheal deviation. Cardiovascular:      Rate and Rhythm: Normal rate. Rhythm irregular. Heart sounds: Normal heart sounds. No murmur heard. No friction rub. No gallop. Pulmonary:      Effort: Pulmonary effort is normal. No respiratory distress. Breath sounds: Normal breath sounds. No wheezing or rales. Abdominal:      Palpations: Abdomen is soft. Tenderness: There is no abdominal tenderness. Musculoskeletal:         General: No swelling or deformity. Cervical back: Neck supple. No muscular tenderness. Right lower leg: Edema (trace) present. Left lower leg: Edema (trace) present. Comments: Ambulates with walker   Skin:     General: Skin is warm and dry. Findings: No rash. Neurological:      General: No focal deficit present. Mental Status: She is alert and oriented to person, place, and time. Cranial Nerves: No cranial nerve deficit. Psychiatric:         Mood and Affect: Mood normal.         Behavior: Behavior normal.         Judgment: Judgment normal.         Data:  Echo 11/13/19  Summary   Mitral valve leaflets are mildly thickened with preserved leaflet   mobility. Mild mitral annular calcification is present. No evidence of mitral valve stenosis. Mild to moderate mitral regurgitation is present. Mild thickening with preserved cusp separation. Aortic valve appears to be tricuspid. No evidence of aortic stenosis or aortic regurgitation. Tricuspid valve is structurally normal.   No evidence of tricuspid stenosis. Mild tricuspid regurgitation noted. Mildly dilated left atrium.    Normal left ventricular size with preserved LV function and an estimated   ejection fraction of approximately 55-60%. No evidence of left ventricular mass or thrombus noted. No regional wall motion abnormalities noted. Restrictive filling pattern. Mildly dilated right atrial dimension with no evidence of thrombus or mass   noted. Normal right ventricular size with preserved RV function. The right ventricular systolic pressure is 33 mmHg. Signature      ----------------------------------------------------------------   Electronically signed by Karina Yepez MD(Interpreting   physician) on 11/13/2019 05:42 PM   ----------------------------------------------------------------       Assessment:     Diagnosis Orders   1. Essential hypertension     2. Chronic atrial fibrillation (HCC)     3. Anxiety         Hypertensionvery difficult to control due to advanced age and atherosclerosis. Anxiety plays a role when she checks her blood pressure multiple times per day even though she is not having symptoms. We discussed checking no more than twice per day or if she feels she is having symptoms of elevated blood pressure. It is unclear if she will follow through with this. She can take a clonidine 0.1 mg for BP greater than 180/100. We discussed a blood pressure goal for a patient her age should be less than or equal to 150/90. We also discussed that her home BP cuff does read a bit higher than our wall cuff. We will add amlodipine 2.5 mg twice a day to her dosing. Chronic atrial fibrillationrate controlled with digoxin and carvedilol. Her PCP monitors labs. Reviewed most recent creatinine at 0.6 under the media tab. Continue same dosage of Eliquis    Stable cardiovascular status. Plan    Return for Dr. Emely Kemp. in March  Add Amlodipine 2.5mg twice a day (take with carvedilol)    Check BP less often.   Recommend twice per day at the most  BP goal for age less than or equal to 150/90    Clonidine 0.1mg for BP > 180/100      Call with any questionsor concerns  Follow up with Delbert Soares MD for non cardiac problems  Report any new problems  Cardiovascular Fitness-Exercise as tolerated. Strive for 15 minutes of exercise most days of the week. Cardiac / HealthyDiet  Continue current medications as directed  Continue plan of treatment  It is always recommended that you bring your medicationsbottles with you to each visit - this is for your safety!        Adelia Cerna, APRN

## 2021-11-08 NOTE — PATIENT INSTRUCTIONS
Return for Dr. Victor Manuel Vega. in March  Add Amlodipine 2.5mg twice a day (take with carvedilol)    Check BP less often.   Recommend twice per day at the most  BP goal for age less than or equal to 150/90    Clonidine 0.1mg for BP > 180/100

## 2021-11-09 ENCOUNTER — TELEPHONE (OUTPATIENT)
Dept: CARDIOLOGY CLINIC | Age: 86
End: 2021-11-09

## 2021-11-09 RX ORDER — AMLODIPINE BESYLATE 2.5 MG/1
2.5 TABLET ORAL 2 TIMES DAILY
Qty: 180 TABLET | Refills: 3 | Status: SHIPPED | OUTPATIENT
Start: 2021-11-09 | End: 2021-12-06

## 2021-12-06 ENCOUNTER — TELEPHONE (OUTPATIENT)
Dept: CARDIOLOGY CLINIC | Age: 86
End: 2021-12-06

## 2021-12-06 NOTE — TELEPHONE ENCOUNTER
Patient called stating she is having swelling to her feet from the amlodipine. She requested to speak with you.

## 2021-12-06 NOTE — TELEPHONE ENCOUNTER
Louie Mujica, it looks like Joshua Garcia started Amlodipine 2.5 twice daily and it's causing leg swelling. Do you recommend to change anything?       Plan     Return for Dr. Naz Cardenas. in March  Add Amlodipine 2.5mg twice a day (take with carvedilol)     Check BP less often.   Recommend twice per day at the most  BP goal for age less than or equal to 150/90     Clonidine 0.1mg for BP > 180/100

## 2021-12-13 ENCOUNTER — TELEPHONE (OUTPATIENT)
Dept: CARDIOLOGY CLINIC | Age: 86
End: 2021-12-13

## 2021-12-14 ENCOUNTER — VIRTUAL VISIT (OUTPATIENT)
Dept: CARDIOLOGY CLINIC | Age: 86
End: 2021-12-14
Payer: MEDICARE

## 2021-12-14 DIAGNOSIS — I10 ESSENTIAL HYPERTENSION: Primary | ICD-10-CM

## 2021-12-14 PROCEDURE — 1090F PRES/ABSN URINE INCON ASSESS: CPT | Performed by: CLINICAL NURSE SPECIALIST

## 2021-12-14 PROCEDURE — 1123F ACP DISCUSS/DSCN MKR DOCD: CPT | Performed by: CLINICAL NURSE SPECIALIST

## 2021-12-14 PROCEDURE — 4040F PNEUMOC VAC/ADMIN/RCVD: CPT | Performed by: CLINICAL NURSE SPECIALIST

## 2021-12-14 PROCEDURE — G8427 DOCREV CUR MEDS BY ELIG CLIN: HCPCS | Performed by: CLINICAL NURSE SPECIALIST

## 2021-12-14 PROCEDURE — 99213 OFFICE O/P EST LOW 20 MIN: CPT | Performed by: CLINICAL NURSE SPECIALIST

## 2021-12-14 RX ORDER — CLONIDINE HYDROCHLORIDE 0.1 MG/1
0.1 TABLET ORAL SEE ADMIN INSTRUCTIONS
Qty: 90 TABLET | Refills: 3 | Status: SHIPPED | OUTPATIENT
Start: 2021-12-14 | End: 2022-05-17 | Stop reason: DRUGHIGH

## 2021-12-14 RX ORDER — CLONIDINE HYDROCHLORIDE 0.3 MG/1
0.3 TABLET ORAL DAILY
Qty: 90 TABLET | Refills: 3 | Status: SHIPPED | OUTPATIENT
Start: 2021-12-14 | End: 2022-05-17 | Stop reason: SDUPTHER

## 2021-12-14 ASSESSMENT — ENCOUNTER SYMPTOMS
SHORTNESS OF BREATH: 0
WHEEZING: 0
ABDOMINAL PAIN: 0
NAUSEA: 0
FACIAL SWELLING: 0
EYE REDNESS: 0
VOMITING: 0
COUGH: 0
CHEST TIGHTNESS: 0

## 2021-12-14 NOTE — PROGRESS NOTES
Cathryn Norris is a 80 y.o. female evaluated via telephone on 2021. Consent:  She and/or health care decision maker is aware that that she may receive a bill for this telephone service, depending on her insurance coverage, and has provided verbal consent to proceed: Yes      Documentation:  I communicated with the patient and/or health care decision maker about HTN. Details of this discussion including any medical advice provided      I affirm this is a Patient Initiated Episode with an Established Patient who has not had a related appointment within my department in the past 7 days or scheduled within the next 24 hours. Total Time: minutes: 11-20 minutes    Note: not billable if this call serves to triage the patient into an appointment for the relevant concern      JONATHAN Dillon      2021    TELEHEALTH EVALUATION -- Audio/Telephone (During [id number] public health emergency)  This service was provided through telehealth, by JONATHAN Abraham at [facility] in [place], [place] and the patient in his/her home via telephone call. Medical Assistant reviewed current medications, pertinent history, and reason for visit. Diagnosis Orders   1. Essential hypertension        HPI:    Cathryn Norris (:  1933) has requested an telephone evaluation for the following concern(s):    Patient is following up today for uncontrolled hypertension after addition of amlodipine. She called the office last week with concerns of lower extremity edema. She stopped the amlodipine on Saturday. Her blood pressure has been better with the amlodipine, but she feels it caused lower extremity edema and facial flushing. She has been obsessive about checking her blood pressure in the past.  She is trying to check it less frequently as it causes her anxiety which further raises her blood pressure.     Current regimen includes:  Carvedilol 25 mg twice a day  Clonidine 0.3 mg at bedtime and 0.1 mg as needed  Losartan 50 mg twice daily  Indapamide 2.5 mg daily    Review of Systems   Constitutional: Positive for fatigue. Negative for activity change, diaphoresis, fever and unexpected weight change. HENT: Negative for facial swelling and nosebleeds. Eyes: Negative for redness and visual disturbance. Respiratory: Negative for cough, chest tightness, shortness of breath and wheezing. Cardiovascular: Positive for leg swelling. Negative for chest pain and palpitations. Gastrointestinal: Negative for abdominal pain, nausea and vomiting. Endocrine: Negative for cold intolerance and heat intolerance. Genitourinary: Negative for dysuria and hematuria. Musculoskeletal: Negative for arthralgias and myalgias. Skin: Negative for pallor and rash. Neurological: Negative for dizziness, seizures, syncope, weakness and light-headedness. Hematological: Does not bruise/bleed easily. Psychiatric/Behavioral: Negative for agitation. The patient is not nervous/anxious. Prior to Visit Medications    Medication Sig Taking?  Authorizing Provider   citalopram (CELEXA) 10 MG tablet Take 10 mg by mouth daily Yes Historical Provider, MD   magnesium oxide (MAG-OX) 400 MG tablet Take 400 mg by mouth daily Yes Historical Provider, MD   cloNIDine (CATAPRES) 0.3 MG tablet TAKE 1 TABLET DAILY AT NIGHT Yes JONATHAN Gudino   carvedilol (COREG) 25 MG tablet TAKE 1 TABLET TWICE DAILY WITH MEALS Yes JONATHAN Osborne   losartan (COZAAR) 50 MG tablet Take 1 tablet by mouth 2 times daily Yes JONATHAN Noonan - CNP   cloNIDine (CATAPRES) 0.1 MG tablet Take 1 tablet by mouth as needed for High Blood Pressure Yes Ajit Arrington MD   apixaban (ELIQUIS) 5 MG TABS tablet TAKE ONE TABLET BY MOUTH 2 TIMES A DAY Yes Ajit Arrington MD   loperamide (RA ANTI-DIARRHEAL) 2 MG capsule Take 2 mg by mouth 2 times daily as needed for Diarrhea  Yes Historical Provider, MD   potassium chloride (KLOR-CON) 20 MEQ packet Take 20 mEq by mouth daily Yes Historical Provider, MD   ferrous sulfate 325 (65 Fe) MG tablet Take 325 mg by mouth nightly  Yes Historical Provider, MD   vitamin B-12 (CYANOCOBALAMIN) 1000 MCG tablet Take 1,000 mcg by mouth daily Yes Historical Provider, MD   indapamide (LOZOL) 2.5 MG tablet Take 2.5 mg by mouth every morning Yes Historical Provider, MD   pravastatin (PRAVACHOL) 40 MG tablet Take 40 mg by mouth daily Yes Historical Provider, MD   omeprazole (PRILOSEC) 20 MG delayed release capsule Take 20 mg by mouth nightly  Yes Historical Provider, MD   metFORMIN (GLUCOPHAGE) 500 MG tablet Take 1,000 mg by mouth 2 times daily (with meals)  Yes Historical Provider, MD   levothyroxine (SYNTHROID) 100 MCG tablet Take 100 mcg by mouth Daily Yes Historical Provider, MD   digoxin (LANOXIN) 125 MCG tablet Take 125 mcg by mouth daily Yes Historical Provider, MD   vitamin D 1000 units CAPS Take by mouth daily Yes Historical Provider, MD       Social History     Tobacco Use    Smoking status: Never Smoker    Smokeless tobacco: Never Used   Vaping Use    Vaping Use: Never used   Substance Use Topics    Alcohol use: No    Drug use: No        Allergies   Allergen Reactions    Altace [Ramipril]     Amlodipine      Swelling, facial flushing    Augmentin [Amoxicillin-Pot Clavulanate]     Biaxin [Clarithromycin]     Codeine     Keflex [Cephalexin]     Paxil [Paroxetine Hcl]     Sulfa Antibiotics        Past Medical History:   Diagnosis Date    [facility] [address]     [facility]     Eczema     GERD (gastroesophageal reflux disease)     Hyperlipidemia     Hypertension     Osteoarthritis     Thyroid disease        Past Surgical History:   Procedure Laterality Date    JOINT REPLACEMENT Right 2013    SHOULDER SURGERY Left 2006       History reviewed. No pertinent family history.         PHYSICAL EXAMINATION:    Patient-Reported Vitals 12/14/2021   Patient-Reported Weight 149 lbs   Patient-Reported Height 5'2 Patient-Reported Systolic 100   Patient-Reported Diastolic 68   Patient-Reported Pulse 65         ASSESSMENT/PLAN:  1. Essential hypertension  Take clonidine 0.1 mg on a regular basis in the afternoon. Double dosage of indapamide due to lower extremity edema for 2 days only, then back to once a day. Return for Dr. Rajendra Patrick, as scheduled. in March  Add Clonidine 0.1mg in the afternoon when BP seems to be more elvated  Double Indapamide for 2 days due to swelling in legs    An  electronic signature was used to authenticate this note. --JONATHAN Crane on 12/14/2021 at 9:44 AM        Pursuant to the emergency declaration under the [facility] waiver authority and the [employer] Act, this telephone Visit was conducted, with patient's consent, to reduce the patient's risk of exposure to COVID-19 and provide continuity of care for an established patient. Services were provided through a telephone discussion virtually to substitute for in-person clinic visit.

## 2021-12-15 ENCOUNTER — TELEPHONE (OUTPATIENT)
Dept: CARDIOLOGY CLINIC | Age: 86
End: 2021-12-15

## 2021-12-15 NOTE — TELEPHONE ENCOUNTER
ASSESSMENT/PLAN:  1. Essential hypertension  Take clonidine 0.1 mg on a regular basis in the afternoon. Double dosage of indapamide due to lower extremity edema for 2 days     Has it been enough time for this to work yet?

## 2021-12-15 NOTE — TELEPHONE ENCOUNTER
Patient called in and advised that she did a virtual visit with Jeff Moreno yesterday and the changes she told her to make just aren't working. She advised her BP got over 200 last night and almost went to the ER. She is requesting to speak with you and Dr. Mark Last regarding this.

## 2021-12-16 DIAGNOSIS — I10 ESSENTIAL HYPERTENSION: Primary | ICD-10-CM

## 2021-12-16 RX ORDER — HYDRALAZINE HYDROCHLORIDE 50 MG/1
50 TABLET, FILM COATED ORAL 3 TIMES DAILY
Qty: 90 TABLET | Refills: 3 | Status: SHIPPED | OUTPATIENT
Start: 2021-12-16

## 2021-12-16 NOTE — TELEPHONE ENCOUNTER
Spoke with patient and advised her to start Hydralazine 50 mg TID per Dr. Schultz. She voiced understanding.

## 2022-01-12 ENCOUNTER — VIRTUAL VISIT (OUTPATIENT)
Dept: CARDIOLOGY CLINIC | Age: 87
End: 2022-01-12
Payer: MEDICARE

## 2022-01-12 DIAGNOSIS — R07.9 CHEST PAIN, UNSPECIFIED TYPE: ICD-10-CM

## 2022-01-12 DIAGNOSIS — I10 ESSENTIAL HYPERTENSION: Primary | ICD-10-CM

## 2022-01-12 DIAGNOSIS — I48.20 CHRONIC ATRIAL FIBRILLATION (HCC): ICD-10-CM

## 2022-01-12 PROCEDURE — 99214 OFFICE O/P EST MOD 30 MIN: CPT | Performed by: CLINICAL NURSE SPECIALIST

## 2022-01-12 PROCEDURE — 1090F PRES/ABSN URINE INCON ASSESS: CPT | Performed by: CLINICAL NURSE SPECIALIST

## 2022-01-12 PROCEDURE — G8427 DOCREV CUR MEDS BY ELIG CLIN: HCPCS | Performed by: CLINICAL NURSE SPECIALIST

## 2022-01-12 PROCEDURE — 1123F ACP DISCUSS/DSCN MKR DOCD: CPT | Performed by: CLINICAL NURSE SPECIALIST

## 2022-01-12 PROCEDURE — 4040F PNEUMOC VAC/ADMIN/RCVD: CPT | Performed by: CLINICAL NURSE SPECIALIST

## 2022-01-12 RX ORDER — NITROGLYCERIN 0.4 MG/1
0.4 TABLET SUBLINGUAL EVERY 5 MIN PRN
Qty: 25 TABLET | Refills: 3 | Status: SHIPPED | OUTPATIENT
Start: 2022-01-12

## 2022-01-12 ASSESSMENT — ENCOUNTER SYMPTOMS
COUGH: 0
SHORTNESS OF BREATH: 0
CHEST TIGHTNESS: 0
EYE REDNESS: 0
NAUSEA: 0
ABDOMINAL PAIN: 0
VOMITING: 0
FACIAL SWELLING: 0
WHEEZING: 0

## 2022-01-12 NOTE — PROGRESS NOTES
Ancelmo Beatty is a 80 y.o. female evaluated via telephone on 2022. Consent:  She and/or health care decision maker is aware that that she may receive a bill for this telephone service, depending on her insurance coverage, and has provided verbal consent to proceed: Yes      Documentation:  I communicated with the patient and/or health care decision maker about HTN. Details of this discussion including any medical advice provided      I affirm this is a Patient Initiated Episode with an Established Patient who has not had a related appointment within my department in the past 7 days or scheduled within the next 24 hours. Total Time: 23 min    Note: not billable if this call serves to triage the patient into an appointment for the relevant concern      JONATHAN Hawley      2022    TELEHEALTH EVALUATION -- Audio/Telephone [facility]  This service was provided through telehealth, by JONATHAN Lua at [facility] in [place], [place] and the patient in his/her home via telephone call. Medical Assistant reviewed current medications, pertinent history, and reason for visit. Diagnosis Orders   1. Essential hypertension     2. Chronic atrial fibrillation (HCC)     3. Chest pain, unspecified type        HPI:    Ancelmo Beatty (:  1933) has requested an telephone evaluation for the following concern(s):    Patient is following up today for uncontrolled hypertension after addition of hydralazine. She called the office for an earlier appointment. She has ongoing issues with her blood pressure being up and down. She checks her blood pressure multiple times during the day and has been encouraged to check it less often.      Current regimen includes:  Carvedilol 25 mg twice a day  Clonidine 0.3 mg at bedtime and 0.1 mg as needed  Losartan 50 mg twice daily  Indapamide 2.5 mg daily  Hydralazine 50mg tid- added in last month    She is also concerned about 3 episodes of a sharp pain in her chest radiating to her left arm with a flushing sensation that occurred on 2 occasions while lying down on 1 occasion while sitting in her chair. Episodes did not occur with exertion and past quickly within a couple of minutes. No associated dyspnea, nausea or diaphoresis    She is concerned that her blood pressure is too low at noon time and bedtime and then she is afraid to take her other scheduled antihypertensive medications. She is asking for parameters about when she should take various medications    Review of Systems   Constitutional: Positive for fatigue. Negative for activity change, diaphoresis, fever and unexpected weight change. HENT: Negative for facial swelling and nosebleeds. Eyes: Negative for redness and visual disturbance. Respiratory: Negative for cough, chest tightness, shortness of breath and wheezing. Cardiovascular: Positive for chest pain. Negative for palpitations and leg swelling. Gastrointestinal: Negative for abdominal pain, nausea and vomiting. Endocrine: Negative for cold intolerance and heat intolerance. Genitourinary: Negative for dysuria and hematuria. Musculoskeletal: Negative for arthralgias and myalgias. Skin: Negative for pallor and rash. Neurological: Negative for dizziness, seizures, syncope, weakness and light-headedness. Hematological: Does not bruise/bleed easily. Psychiatric/Behavioral: Negative for agitation. The patient is not nervous/anxious. Prior to Visit Medications    Medication Sig Taking?  Authorizing Provider   hydrALAZINE (APRESOLINE) 50 MG tablet Take 1 tablet by mouth 3 times daily Yes Gonzalez Jones MD   cloNIDine (CATAPRES) 0.3 MG tablet Take 1 tablet by mouth daily Yes JONATHAN Garcia   cloNIDine (CATAPRES) 0.1 MG tablet Take 1 tablet by mouth See Admin Instructions Yes JONATHAN Garcia   citalopram (CELEXA) 10 MG tablet Take 10 mg by mouth daily Yes Historical Provider, MD magnesium oxide (MAG-OX) 400 MG tablet Take 400 mg by mouth daily Yes Historical Provider, MD   carvedilol (COREG) 25 MG tablet TAKE 1 TABLET TWICE DAILY WITH MEALS Yes JONATHAN Durán   losartan (COZAAR) 50 MG tablet Take 1 tablet by mouth 2 times daily Yes JONATHAN Noonan - CNP   apixaban (ELIQUIS) 5 MG TABS tablet TAKE ONE TABLET BY MOUTH 2 TIMES A DAY Yes Desire Zuniga MD   loperamide (RA ANTI-DIARRHEAL) 2 MG capsule Take 2 mg by mouth 2 times daily as needed for Diarrhea  Yes Historical Provider, MD   potassium chloride (KLOR-CON) 20 MEQ packet Take 20 mEq by mouth daily Yes Historical Provider, MD   ferrous sulfate 325 (65 Fe) MG tablet Take 325 mg by mouth daily (with breakfast)  Yes Historical Provider, MD   vitamin B-12 (CYANOCOBALAMIN) 1000 MCG tablet Take 1,000 mcg by mouth daily Yes Historical Provider, MD   indapamide (LOZOL) 2.5 MG tablet Take 2.5 mg by mouth every morning Yes Historical Provider, MD   pravastatin (PRAVACHOL) 40 MG tablet Take 40 mg by mouth daily Yes Historical Provider, MD   omeprazole (PRILOSEC) 20 MG delayed release capsule Take 20 mg by mouth nightly  Yes Historical Provider, MD   metFORMIN (GLUCOPHAGE) 500 MG tablet Take 1,000 mg by mouth 2 times daily (with meals)  Yes Historical Provider, MD   levothyroxine (SYNTHROID) 100 MCG tablet Take 100 mcg by mouth Daily Yes Historical Provider, MD   digoxin (LANOXIN) 125 MCG tablet Take 125 mcg by mouth daily Yes Historical Provider, MD   vitamin D 1000 units CAPS Take by mouth daily Yes Historical Provider, MD       Social History     Tobacco Use    Smoking status: Never Smoker    Smokeless tobacco: Never Used   Vaping Use    Vaping Use: Never used   Substance Use Topics    Alcohol use: No    Drug use: No        Allergies   Allergen Reactions    Altace [Ramipril]     Amlodipine      Swelling, facial flushing    Augmentin [Amoxicillin-Pot Clavulanate]     Biaxin [Clarithromycin]     Codeine     Keflex [Cephalexin]     Paxil [Paroxetine Hcl]     Sulfa Antibiotics        Past Medical History:   Diagnosis Date    [facility]     Diabetes mellitus ([facility]     Eczema     GERD (gastroesophageal reflux disease)     Hyperlipidemia     Hypertension     Osteoarthritis     Thyroid disease        Past Surgical History:   Procedure Laterality Date    JOINT REPLACEMENT Right 2013    SHOULDER SURGERY Left 2006       History reviewed. No pertinent family history. PHYSICAL EXAMINATION:    Patient-Reported Vitals 1/12/2022   Patient-Reported Weight 151   Patient-Reported Height 5'2   Patient-Reported Systolic 928   Patient-Reported Diastolic 79   Patient-Reported Pulse 64         ASSESSMENT/PLAN:  1. Essential hypertension  If systolic blood pressure less than 120 at noon, skip hydralazine. If systolic blood pressure less than 120 at bedtime, decrease dose of clonidine to 0.1 mg    2. Chronic atrial fibrillation  Rate controlled and anticoagulated with Eliquis without bleeding or falling issues, stable    3. Chest pain  Episodes are not exertional in nature and could be related to acid reflux which she has a history of. We will give her nitroglycerin to use sublingually as needed. She can call for increasing frequency of episodes. We discussed the possibility of stress testing, but at her age would recommend conservative treatment    Return for Dr. Alina Leung, as scheduled. Nitro as needed for chest pain- call for increasing episodes  If BP < 953 systolic at noon- skip Hydralazine  If BP < 120 at bedtime, decrease Clonidine to 0.1mg      An  electronic signature was used to authenticate this note.     --Cornell Amos, APRN on 1/12/2022 at 11:07 AM        Pursuant to the emergency declaration under the [facility] waiver authority and the [employer] Act, this telephone Visit was conducted, with patient's consent, to reduce the patient's risk of exposure to COVID-19 and provide continuity of care for an established patient. Services were provided through a telephone discussion virtually to substitute for in-person clinic visit.

## 2022-02-01 DIAGNOSIS — I48.20 CHRONIC ATRIAL FIBRILLATION (HCC): ICD-10-CM

## 2022-02-01 DIAGNOSIS — I10 ESSENTIAL HYPERTENSION: ICD-10-CM

## 2022-02-01 RX ORDER — CARVEDILOL 25 MG/1
TABLET ORAL
Qty: 180 TABLET | Refills: 3 | Status: SHIPPED | OUTPATIENT
Start: 2022-02-01 | End: 2022-11-01

## 2022-02-09 DIAGNOSIS — I48.20 CHRONIC ATRIAL FIBRILLATION (HCC): ICD-10-CM

## 2022-02-17 ENCOUNTER — TELEPHONE (OUTPATIENT)
Dept: CARDIOLOGY CLINIC | Age: 87
End: 2022-02-17

## 2022-02-21 DIAGNOSIS — I48.20 CHRONIC ATRIAL FIBRILLATION (HCC): Primary | ICD-10-CM

## 2022-02-21 RX ORDER — DIGOXIN 125 MCG
125 TABLET ORAL DAILY
Qty: 30 TABLET | Refills: 1 | Status: SHIPPED | OUTPATIENT
Start: 2022-02-21 | End: 2022-03-23 | Stop reason: SDUPTHER

## 2022-02-22 NOTE — TELEPHONE ENCOUNTER
Spoke with patient. She just wanted to make sure it was okay to take generic digoxin since Shannon CASTILLO was on the bottle instead of Dr. Lieutenant Larose. Advised patient Dr. Lieutenant Larose is only here Tuesdays and Wednesdays so one of the APRN's signed off. She voiced understanding.

## 2022-03-23 ENCOUNTER — OFFICE VISIT (OUTPATIENT)
Dept: CARDIOLOGY CLINIC | Age: 87
End: 2022-03-23
Payer: MEDICARE

## 2022-03-23 VITALS
HEART RATE: 73 BPM | BODY MASS INDEX: 27.97 KG/M2 | DIASTOLIC BLOOD PRESSURE: 78 MMHG | WEIGHT: 152 LBS | HEIGHT: 62 IN | SYSTOLIC BLOOD PRESSURE: 132 MMHG

## 2022-03-23 DIAGNOSIS — I48.20 CHRONIC ATRIAL FIBRILLATION (HCC): Primary | ICD-10-CM

## 2022-03-23 PROCEDURE — 1090F PRES/ABSN URINE INCON ASSESS: CPT | Performed by: INTERNAL MEDICINE

## 2022-03-23 PROCEDURE — G8417 CALC BMI ABV UP PARAM F/U: HCPCS | Performed by: INTERNAL MEDICINE

## 2022-03-23 PROCEDURE — 93000 ELECTROCARDIOGRAM COMPLETE: CPT | Performed by: INTERNAL MEDICINE

## 2022-03-23 PROCEDURE — 1036F TOBACCO NON-USER: CPT | Performed by: INTERNAL MEDICINE

## 2022-03-23 PROCEDURE — 1123F ACP DISCUSS/DSCN MKR DOCD: CPT | Performed by: INTERNAL MEDICINE

## 2022-03-23 PROCEDURE — G8427 DOCREV CUR MEDS BY ELIG CLIN: HCPCS | Performed by: INTERNAL MEDICINE

## 2022-03-23 PROCEDURE — G8484 FLU IMMUNIZE NO ADMIN: HCPCS | Performed by: INTERNAL MEDICINE

## 2022-03-23 PROCEDURE — 99214 OFFICE O/P EST MOD 30 MIN: CPT | Performed by: INTERNAL MEDICINE

## 2022-03-23 PROCEDURE — 4040F PNEUMOC VAC/ADMIN/RCVD: CPT | Performed by: INTERNAL MEDICINE

## 2022-03-23 RX ORDER — DIGOXIN 125 MCG
125 TABLET ORAL DAILY
Qty: 90 TABLET | Refills: 3 | Status: SHIPPED | OUTPATIENT
Start: 2022-03-23 | End: 2022-05-17 | Stop reason: SINTOL

## 2022-03-23 NOTE — PROGRESS NOTES
Pravachol  3. Atrial fibrillation -acceptable ventricular response control and Eliquis in place  4.   Pandemic response -appropriate/vaccinated/boosted

## 2022-05-04 ENCOUNTER — TELEPHONE (OUTPATIENT)
Dept: CARDIOLOGY CLINIC | Age: 87
End: 2022-05-04

## 2022-05-04 NOTE — TELEPHONE ENCOUNTER
Pt called stating yesterday her BP got low, diastolic number was 35 it scared her so she jumped up to get a glass of water and it went back up to 100 within a min. Pt states she spoke to her PCP MD and he wanted her to stop the digoxin. Pt doesn't want to stop any med without Dr. Prakash Sanchez giving her the ok. Pls advise.

## 2022-05-05 NOTE — TELEPHONE ENCOUNTER
Spoke with patient and she advised it was not her blood pressure that got down to 35, it was her heart rate. That is why her PCP advised to stop digoxin. However her blood pressure is fluctuating between very low and very high. She has taken herself off maintenance dose of hydralazine and only takes it PRN. Offered to schedule OV and she declined. She takes   · Coreg 25 BID  · Clonidine 0.3 qam  · Clonidine 0.1 qhs  · Digoxin 125 qd  · Indapamide 2.5 qam  · Losartan qd and will take second dose if she feels her BP needs it    Do you have any recommendations?

## 2022-05-06 NOTE — TELEPHONE ENCOUNTER
Have her monitor blood pressure and heart rate for the next several days. Blood pressure log and when and if she takes any of her extra medication.   Okay to hold digoxin  Let us know how it is doing after about a week

## 2022-05-09 NOTE — TELEPHONE ENCOUNTER
Discussed the below with the Pt, Pt stated that she is worried about her HR being so low, but also is scared to stop her Digoxin due to Dr. Cindy Escalona telling her in the past to never stop. I discussed with the PT that it was advised from one of Dr. Brenda Harry and that was why she would like for the PT to keep a log; also offered another appt to discuss this in person with APRN, Pt refused. PT voiced her understanding and stated she would not take it today, and call us on Monday to review her log. PT stated she would call the office before Monday if she has any trouble with her HR or Afib.

## 2022-05-11 ENCOUNTER — TELEPHONE (OUTPATIENT)
Dept: CARDIOLOGY CLINIC | Age: 87
End: 2022-05-11

## 2022-05-11 NOTE — TELEPHONE ENCOUNTER
Patients wants to see Dr. Ramila Rogers next week due to her blood pressure being low at 97/53 77. Patient consisently kept calling her systolic blood pressure reading her \"working heart rate\" and she was calling her HR her resting heart rate. Patient kept telling me that her working HR was low at 91 and I told her normal HR was  and it was not low. She said, \"Oh great I'm just going to croak\" It took some time to figure out that patient was confused to what her readings meant on her monitor. I had to explain to the patient the difference in systolic BP, diastolic BP, and HR reading. Advised I would have you call her tomorrow to see about an appt with Dr. Ramila Rogers.

## 2022-05-12 NOTE — TELEPHONE ENCOUNTER
She can absolutely come in next week! He has openings in [facility], at [facility], and [facility]. Thanks Argelia Funk!

## 2022-05-12 NOTE — TELEPHONE ENCOUNTER
Spoke with patient and I have her scheduled to see Dr. Cindy Escalona on 5/17/22 at 2:15. She voiced understanding.

## 2022-05-17 ENCOUNTER — OFFICE VISIT (OUTPATIENT)
Dept: CARDIOLOGY CLINIC | Age: 87
End: 2022-05-17
Payer: MEDICARE

## 2022-05-17 VITALS
DIASTOLIC BLOOD PRESSURE: 56 MMHG | HEART RATE: 94 BPM | HEIGHT: 62 IN | SYSTOLIC BLOOD PRESSURE: 98 MMHG | BODY MASS INDEX: 27.6 KG/M2 | WEIGHT: 150 LBS

## 2022-05-17 DIAGNOSIS — I48.20 CHRONIC ATRIAL FIBRILLATION (HCC): Primary | ICD-10-CM

## 2022-05-17 DIAGNOSIS — I10 ESSENTIAL HYPERTENSION: ICD-10-CM

## 2022-05-17 PROCEDURE — 1090F PRES/ABSN URINE INCON ASSESS: CPT | Performed by: INTERNAL MEDICINE

## 2022-05-17 PROCEDURE — 93000 ELECTROCARDIOGRAM COMPLETE: CPT | Performed by: INTERNAL MEDICINE

## 2022-05-17 PROCEDURE — G8417 CALC BMI ABV UP PARAM F/U: HCPCS | Performed by: INTERNAL MEDICINE

## 2022-05-17 PROCEDURE — 1123F ACP DISCUSS/DSCN MKR DOCD: CPT | Performed by: INTERNAL MEDICINE

## 2022-05-17 PROCEDURE — 1036F TOBACCO NON-USER: CPT | Performed by: INTERNAL MEDICINE

## 2022-05-17 PROCEDURE — 4040F PNEUMOC VAC/ADMIN/RCVD: CPT | Performed by: INTERNAL MEDICINE

## 2022-05-17 PROCEDURE — G8427 DOCREV CUR MEDS BY ELIG CLIN: HCPCS | Performed by: INTERNAL MEDICINE

## 2022-05-17 PROCEDURE — 99213 OFFICE O/P EST LOW 20 MIN: CPT | Performed by: INTERNAL MEDICINE

## 2022-05-17 RX ORDER — DIGOXIN 125 MCG
TABLET ORAL
Qty: 30 TABLET | Refills: 3 | Status: SHIPPED | OUTPATIENT
Start: 2022-05-17

## 2022-05-17 RX ORDER — CLONIDINE HYDROCHLORIDE 0.2 MG/1
0.2 TABLET ORAL NIGHTLY
Qty: 90 TABLET | Refills: 3 | Status: SHIPPED | OUTPATIENT
Start: 2022-05-17

## 2022-05-17 NOTE — PROGRESS NOTES
HISTORY  71-year-old lady with a history of dyslipidemia, diabetes, hypertension, and chronic atrial fibrillation returns to address medications governing her heart rate and blood pressure. Because of significant bradycardia her digoxin has been discontinued. As concerns her blood pressure at the time of last visit she was troubled by a marked rise in her pressure in the afternoons prompting the recommendation to change her hydralazine from 50 midday to 25 3 times daily. She never made this latter change. Her blood pressure however has been too low, often in the mornings and she has been chasing her pressures, first holding hermeds, and then taking them later. PHYSICAL EXAM  On exam she carries 150 pounds in a 5 to 2 inch frame. Pressure is 98/56 with an irregular pulse in the mid 90s. EKG reveals atrial fibrillation with a ventricular response rate of 94, low voltage in the limb leads, and poor R wave progression precluding exclusion of a previous anterior infarction. ASSESSMENT/PLAN:   1. Ventricular response rate -advised to take digoxin 0.125 mg Monday Wednesday and Friday. 2.  Hypertension -advised to reduce her nocturnal clonidine to 0.2 nightly. Continue her losartan, indapamide, and hydralazine as previously. We will see her in 1 month to further deliver her regimen as needed.

## 2022-05-17 NOTE — PATIENT INSTRUCTIONS
· Start Digoxin back with 125 mcg on Mondays, Wednesdays, and Fridays.   · Reduce nightly Clonidine dose to 0.2 mg.

## 2022-07-05 ENCOUNTER — TELEPHONE (OUTPATIENT)
Dept: CARDIOLOGY CLINIC | Age: 87
End: 2022-07-05

## 2022-07-06 ENCOUNTER — OFFICE VISIT (OUTPATIENT)
Dept: CARDIOLOGY CLINIC | Age: 87
End: 2022-07-06
Payer: MEDICARE

## 2022-07-06 VITALS
HEART RATE: 78 BPM | DIASTOLIC BLOOD PRESSURE: 66 MMHG | WEIGHT: 153 LBS | HEIGHT: 62 IN | SYSTOLIC BLOOD PRESSURE: 118 MMHG | BODY MASS INDEX: 28.16 KG/M2

## 2022-07-06 DIAGNOSIS — I48.20 CHRONIC ATRIAL FIBRILLATION (HCC): Primary | ICD-10-CM

## 2022-07-06 PROCEDURE — 1036F TOBACCO NON-USER: CPT | Performed by: INTERNAL MEDICINE

## 2022-07-06 PROCEDURE — G8427 DOCREV CUR MEDS BY ELIG CLIN: HCPCS | Performed by: INTERNAL MEDICINE

## 2022-07-06 PROCEDURE — 1090F PRES/ABSN URINE INCON ASSESS: CPT | Performed by: INTERNAL MEDICINE

## 2022-07-06 PROCEDURE — 99213 OFFICE O/P EST LOW 20 MIN: CPT | Performed by: INTERNAL MEDICINE

## 2022-07-06 PROCEDURE — 1123F ACP DISCUSS/DSCN MKR DOCD: CPT | Performed by: INTERNAL MEDICINE

## 2022-07-06 PROCEDURE — G8417 CALC BMI ABV UP PARAM F/U: HCPCS | Performed by: INTERNAL MEDICINE

## 2022-07-06 PROCEDURE — 93000 ELECTROCARDIOGRAM COMPLETE: CPT | Performed by: INTERNAL MEDICINE

## 2022-07-06 RX ORDER — ESTRADIOL 0.1 MG/G
2 CREAM VAGINAL DAILY
COMMUNITY

## 2022-07-06 NOTE — PROGRESS NOTES
HISTORY  59-year-old lady with a history of dyslipidemia, diabetes, hypertension, and chronic atrial fibrillation returns for follow-up visit. Most recently there have been an effort to titrate her medical regimen to adequately control her blood pressure avoiding episodes of hypotension. On return today she tells me her pressure has been running pretty well on a regimen that consists only of carvedilol 25 twice daily, losartan 50 twice daily, and indapamide 2.5 mg daily. She has been vaccinated and boosted for COVID-19. PHYSICAL EXAM  On exam she carries 153 pounds on a 5 foot 2 inch frame. Pressure is 118/66 with a pulse of 78. EKG reveals atrial fibrillation with a ventricular response rate of 78, poor R wave progression, nonspecific ST-T wave abnormalities. ASSESSMENT/PLAN:   1. Hypertension -reasonable control on the regimen above consisting of carvedilol, losartan, and indapamide. Advised her to stop frequently checking her blood pressures unless she had symptoms of orthostasis [dizziness on standing]. 2.  Chronic atrial fibrillation -acceptable ventricular response rate on digoxin 0.125 mg 3 times weekly. Continue Eliquis. Renal function and weight appear to justify full dose at this point in time.   3.  Pandemic response -appropriate/vaccinated/boosted

## 2022-09-26 ENCOUNTER — TELEPHONE (OUTPATIENT)
Dept: CARDIOLOGY CLINIC | Age: 87
End: 2022-09-26

## 2022-09-27 NOTE — TELEPHONE ENCOUNTER
Spoke with patient about stopping the clonidine at bedtime. She states she will try this and let us know if this helps the low BP's, I informed her to please call the office back if this does not help.

## 2022-10-05 ENCOUNTER — TELEPHONE (OUTPATIENT)
Dept: CARDIOLOGY CLINIC | Age: 87
End: 2022-10-05

## 2022-10-05 NOTE — TELEPHONE ENCOUNTER
Patient called stating, \"I need Alix to ask Dr. Sunday Jones if I need more medicine for my heart. I can only take one carvedilol a day, my blood pressure is low. Do I need to take my digoxin everyday instead of 3 times a week. I'm not getting anything for my heart. \" HR are 88, 87, 83, 99, 96. Advised those are normal heart rates. Patient stated, \"Well are you a diagnostician? 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 Asked patient how BP has been. She stated 118, 109. Advised I needed both numbers to her blood pressure. She stated 121/53, 116/58, 118/56. Advised I would have you call her after you talk with Dr. Sunday Jones.

## 2022-10-26 DIAGNOSIS — I10 ESSENTIAL HYPERTENSION: ICD-10-CM

## 2022-10-27 RX ORDER — LOSARTAN POTASSIUM 50 MG/1
TABLET ORAL
Qty: 180 TABLET | Refills: 2 | Status: SHIPPED | OUTPATIENT
Start: 2022-10-27

## 2022-11-01 DIAGNOSIS — I48.20 CHRONIC ATRIAL FIBRILLATION (HCC): ICD-10-CM

## 2022-11-01 DIAGNOSIS — I10 ESSENTIAL HYPERTENSION: ICD-10-CM

## 2022-11-01 RX ORDER — CARVEDILOL 25 MG/1
TABLET ORAL
Qty: 180 TABLET | Refills: 3 | Status: SHIPPED | OUTPATIENT
Start: 2022-11-01

## 2023-01-30 ENCOUNTER — TELEPHONE (OUTPATIENT)
Dept: CARDIOLOGY CLINIC | Age: 88
End: 2023-01-30

## 2023-03-28 DIAGNOSIS — I48.20 CHRONIC ATRIAL FIBRILLATION (HCC): ICD-10-CM

## 2023-03-28 RX ORDER — DIGOXIN 125 MCG
TABLET ORAL
Qty: 30 TABLET | Refills: 5 | Status: SHIPPED | OUTPATIENT
Start: 2023-03-28

## 2023-04-05 DIAGNOSIS — I48.20 CHRONIC ATRIAL FIBRILLATION (HCC): ICD-10-CM

## 2023-04-05 RX ORDER — DIGOXIN 125 MCG
TABLET ORAL
Qty: 30 TABLET | Refills: 5 | Status: SHIPPED | OUTPATIENT
Start: 2023-04-05

## 2023-04-18 ENCOUNTER — TELEPHONE (OUTPATIENT)
Dept: CARDIOLOGY CLINIC | Age: 88
End: 2023-04-18

## 2023-04-18 NOTE — TELEPHONE ENCOUNTER
Spoke to patient and advised she has been on losartan 50 mg twice daily according to our records for the last several visits. Did advise she has an appointment next week with Dr. May Campos and to discuss what dose she needs at that visit. She voiced understanding.

## 2023-04-18 NOTE — TELEPHONE ENCOUNTER
Pt called stating she takes losartan 25 mg bid and has been for years. Someone sent in Losartan 50 mg and pt states she has never taking that is afraid to because pt states she only takes the losartan 25 mg when her BP gets high and that isn't very often. She doesn't take losartan daily.

## 2023-04-26 ENCOUNTER — OFFICE VISIT (OUTPATIENT)
Dept: CARDIOLOGY CLINIC | Age: 88
End: 2023-04-26
Payer: MEDICARE

## 2023-04-26 VITALS
DIASTOLIC BLOOD PRESSURE: 82 MMHG | HEIGHT: 62 IN | BODY MASS INDEX: 28.34 KG/M2 | SYSTOLIC BLOOD PRESSURE: 126 MMHG | HEART RATE: 82 BPM | WEIGHT: 154 LBS

## 2023-04-26 DIAGNOSIS — I48.20 CHRONIC ATRIAL FIBRILLATION (HCC): Primary | ICD-10-CM

## 2023-04-26 PROCEDURE — G8427 DOCREV CUR MEDS BY ELIG CLIN: HCPCS | Performed by: INTERNAL MEDICINE

## 2023-04-26 PROCEDURE — 1036F TOBACCO NON-USER: CPT | Performed by: INTERNAL MEDICINE

## 2023-04-26 PROCEDURE — 1123F ACP DISCUSS/DSCN MKR DOCD: CPT | Performed by: INTERNAL MEDICINE

## 2023-04-26 PROCEDURE — 1090F PRES/ABSN URINE INCON ASSESS: CPT | Performed by: INTERNAL MEDICINE

## 2023-04-26 PROCEDURE — 93000 ELECTROCARDIOGRAM COMPLETE: CPT | Performed by: INTERNAL MEDICINE

## 2023-04-26 PROCEDURE — G8417 CALC BMI ABV UP PARAM F/U: HCPCS | Performed by: INTERNAL MEDICINE

## 2023-04-26 PROCEDURE — 99213 OFFICE O/P EST LOW 20 MIN: CPT | Performed by: INTERNAL MEDICINE

## 2023-04-26 NOTE — PROGRESS NOTES
HISTORY  80-year-old lady with a history of dyslipidemia, diabetes, hypertension, and chronic atrial fibrillation returns for a follow-up visit. Her most recent medical difficulties have been those of adequate hypertension control while avoiding episodes of hypotension [pressures have been very labile]. Her lipids have been well addressed though the last values in our records come from December 2020 with an LDL of 32, HDL 48, and triglycerides 149. On return today she relates an ongoing challenge even controlling her blood pressures because of wide swings from 150 to the low 100s. In adjusting her medicines [particularly the losartan] and what sounds like reasonably successful management. She has had no chest discomfort, orthopnea, PND, or presyncope. She has been vaccinated and boosted once for COVID-19. PHYSICAL EXAM  On exam she carries 154 pounds in a 5 foot 2 inch frame. Pressure is 126/82 with an irregular pulse of 82. Examined in a chair she has no JVD or carotid abnormalities at 90 degrees. Lungs clear to auscultation. Regular heart sounds without significant murmur. No significant lower extremity edema. EKG reveals atrial fibrillation with ventricular response of 82 delayed R wave progression and diffuse nonspecific ST-T wave abnormalities. ASSESSMENT/PLAN:   Chronic atrial fibrillation -no ventricular response rate and anticoagulant coverage in place. Will approach the need to reduce her Eliquis dose in the near future. Continue digoxin, carvedilol, and Eliquis  Hypertension -reasonable management. Continue carvedilol, clonidine, hydralazine, indapamide, and varying doses of losartan.   Coronary disease risk -coronaries in 2008

## 2023-10-04 DIAGNOSIS — I48.20 CHRONIC ATRIAL FIBRILLATION (HCC): ICD-10-CM

## 2023-10-04 RX ORDER — DIGOXIN 125 MCG
TABLET ORAL
Qty: 30 TABLET | Refills: 5 | Status: SHIPPED | OUTPATIENT
Start: 2023-10-04

## 2023-10-05 DIAGNOSIS — I48.20 CHRONIC ATRIAL FIBRILLATION (HCC): ICD-10-CM

## 2023-10-23 DIAGNOSIS — I10 ESSENTIAL HYPERTENSION: ICD-10-CM

## 2023-10-23 RX ORDER — CLONIDINE HYDROCHLORIDE 0.2 MG/1
0.2 TABLET ORAL NIGHTLY
Qty: 90 TABLET | Refills: 3 | Status: SHIPPED | OUTPATIENT
Start: 2023-10-23 | End: 2023-10-26

## 2023-10-26 ENCOUNTER — OFFICE VISIT (OUTPATIENT)
Dept: CARDIOLOGY CLINIC | Age: 88
End: 2023-10-26
Payer: MEDICARE

## 2023-10-26 VITALS
BODY MASS INDEX: 27.79 KG/M2 | WEIGHT: 151 LBS | HEIGHT: 62 IN | DIASTOLIC BLOOD PRESSURE: 70 MMHG | OXYGEN SATURATION: 96 % | SYSTOLIC BLOOD PRESSURE: 134 MMHG | HEART RATE: 65 BPM

## 2023-10-26 DIAGNOSIS — I48.20 CHRONIC ATRIAL FIBRILLATION (HCC): Primary | ICD-10-CM

## 2023-10-26 PROCEDURE — 99214 OFFICE O/P EST MOD 30 MIN: CPT | Performed by: INTERNAL MEDICINE

## 2023-10-26 PROCEDURE — 1036F TOBACCO NON-USER: CPT | Performed by: INTERNAL MEDICINE

## 2023-10-26 PROCEDURE — 1090F PRES/ABSN URINE INCON ASSESS: CPT | Performed by: INTERNAL MEDICINE

## 2023-10-26 PROCEDURE — G8417 CALC BMI ABV UP PARAM F/U: HCPCS | Performed by: INTERNAL MEDICINE

## 2023-10-26 PROCEDURE — G8427 DOCREV CUR MEDS BY ELIG CLIN: HCPCS | Performed by: INTERNAL MEDICINE

## 2023-10-26 PROCEDURE — G8484 FLU IMMUNIZE NO ADMIN: HCPCS | Performed by: INTERNAL MEDICINE

## 2023-10-26 PROCEDURE — 1123F ACP DISCUSS/DSCN MKR DOCD: CPT | Performed by: INTERNAL MEDICINE

## 2023-10-26 RX ORDER — CLONIDINE HYDROCHLORIDE 0.1 MG/1
0.1 TABLET ORAL EVERY 4 HOURS PRN
Qty: 60 TABLET | Refills: 5 | Status: SHIPPED | OUTPATIENT
Start: 2023-10-26

## 2023-10-26 RX ORDER — CLONIDINE HYDROCHLORIDE 0.1 MG/1
0.1 TABLET ORAL AS NEEDED
COMMUNITY

## 2024-01-10 DIAGNOSIS — I48.20 CHRONIC ATRIAL FIBRILLATION (HCC): ICD-10-CM

## 2024-01-10 DIAGNOSIS — I10 ESSENTIAL HYPERTENSION: ICD-10-CM

## 2024-01-10 RX ORDER — CARVEDILOL 25 MG/1
25 TABLET ORAL 2 TIMES DAILY WITH MEALS
Qty: 180 TABLET | Refills: 3 | Status: SHIPPED | OUTPATIENT
Start: 2024-01-10

## 2024-05-06 ENCOUNTER — OFFICE VISIT (OUTPATIENT)
Dept: CARDIOLOGY CLINIC | Age: 89
End: 2024-05-06
Payer: MEDICARE

## 2024-05-06 VITALS
OXYGEN SATURATION: 93 % | SYSTOLIC BLOOD PRESSURE: 124 MMHG | WEIGHT: 148 LBS | DIASTOLIC BLOOD PRESSURE: 60 MMHG | BODY MASS INDEX: 27.23 KG/M2 | HEART RATE: 70 BPM | HEIGHT: 62 IN

## 2024-05-06 DIAGNOSIS — R60.0 EDEMA OF BOTH LOWER EXTREMITIES: ICD-10-CM

## 2024-05-06 DIAGNOSIS — I48.20 CHRONIC ATRIAL FIBRILLATION (HCC): Primary | ICD-10-CM

## 2024-05-06 DIAGNOSIS — I10 ESSENTIAL HYPERTENSION: ICD-10-CM

## 2024-05-06 PROCEDURE — 1123F ACP DISCUSS/DSCN MKR DOCD: CPT | Performed by: CLINICAL NURSE SPECIALIST

## 2024-05-06 PROCEDURE — 99214 OFFICE O/P EST MOD 30 MIN: CPT | Performed by: CLINICAL NURSE SPECIALIST

## 2024-05-06 PROCEDURE — 93000 ELECTROCARDIOGRAM COMPLETE: CPT | Performed by: CLINICAL NURSE SPECIALIST

## 2024-05-06 ASSESSMENT — ENCOUNTER SYMPTOMS
WHEEZING: 0
COUGH: 0
FACIAL SWELLING: 0
VOMITING: 0
ABDOMINAL PAIN: 0
SHORTNESS OF BREATH: 0
CHEST TIGHTNESS: 0
EYE REDNESS: 0
NAUSEA: 0

## 2024-05-06 NOTE — PROGRESS NOTES
[facility], [facility]  [address] [address]  Phone: [phone]  Fax: [phone]    OFFICE VISIT:  2024    Margaux Rich - : 1933    Reason For Visit:  Margaux is a 89 y.o. female who is here for Follow-up (No cardiac symptoms) and Hypertension       Diagnosis Orders   1. Chronic atrial fibrillation (HCC)  EKG 12 lead      2. Essential hypertension  EKG 12 lead      3. Edema of both lower extremities              HPI  Patient follows with our office with a history of chronic atrial fibrillation history of labile hypertension.    In recent months, she feels her blood pressure has been better controlled.  She generally self manages her medication.  She is fearful about blood pressure getting low.  She uses clonidine and losartan on an as-needed basis.  She takes her blood pressure frequently throughout the day.    She has some chronic lower extremity edema, no worse than usual.  No sudden weight gain, in fact she is losing weight.  She denies orthopnea, PND.  She has no chest pain or palpitations.  Her main complaint is mobility issues.    She was told to stop her digoxin by Dr. Flynn at last visit, but she has continued on this medication as she has been on it for years and was concerned about stopping it.       Chad Silveira MD is PCP.  Margaux Rich has the following history as recorded in [facility]:    Patient Active Problem List    Diagnosis Date Noted    Edema of both lower extremities 2024    Essential hypertension 10/12/2018    Dyslipidemia 10/12/2018    Type 2 diabetes mellitus (HCC) 10/12/2018    Hiatal hernia 10/12/2018    GERD (gastroesophageal reflux disease) 10/12/2018    Hyperthyroidism 10/12/2018    Ulcerative colitis (HCC) 10/12/2018    Anxiety 10/12/2018    Depression 10/12/2018    Osteoarthritis 10/12/2018     Past Medical History:   Diagnosis Date    Afib (HCC)     Diabetes mellitus (HCC)     Eczema     GERD

## 2024-05-28 ENCOUNTER — TELEPHONE (OUTPATIENT)
Dept: CARDIOLOGY CLINIC | Age: 89
End: 2024-05-28

## 2024-05-28 NOTE — TELEPHONE ENCOUNTER
PT called stating Dr. Flynn took her off digoxin. Since then her heart rate has been getting up to 125 most days. Pt states she can take the coreg and get it down, but she can only take the coreg if her BP is high enough and most days her BP is NOT high enough for her to take the coreg. Pt wants to know if there is something different she can take.

## 2024-05-28 NOTE — TELEPHONE ENCOUNTER
If her BP is not high enough to take the carvedilol, then perhaps we need to decrease the carvedilol dosage so she can take it on a regular basis to keep her heart rate controlled.  The medicine list currently states she is taking it at 25 mg twice a day, but if she is not using it this is likely the reason her heart rate is controlled.  We could try to cut the dosage back to 12.5 mg twice a day

## 2024-05-28 NOTE — TELEPHONE ENCOUNTER
Margaux E Johnson called  to find out if medications will be called in, or what she needs to do about heart racing.    Pt using [facility] on [address] in [place]. Please call pt to advise.

## 2024-05-29 RX ORDER — METOPROLOL TARTRATE 100 MG/1
100 TABLET ORAL 2 TIMES DAILY
Qty: 90 TABLET | Refills: 1 | Status: SHIPPED | OUTPATIENT
Start: 2024-05-29

## 2024-05-29 NOTE — TELEPHONE ENCOUNTER
Patient states she is taking her carvedilol 25 mg BID and it's not controlling her HR. She states by 3pm her HR is getting elevated around 125-131. I asked her about her blood pressure since the message yesterday stated it was low. She told me, \"my blood pressure runs just fine, today it was 139/93 \" Patient wants to know if she can be switched to metoprolol because she read on the internet that out of all the beta blockers it does not cause diarrhea. Patient states she has ulcerative colitis and that is an issue for her. She did mention that her digoxin was stopped by Dr. Flynn and was told she could take it if she wanted but it wouldn't help, she said nothing else was given in place of that. I did explain that the carvedilol is meant to slower her heart rate. Please advise.

## 2024-05-29 NOTE — TELEPHONE ENCOUNTER
Due to her advanced age, the digoxin is dangerous as it can build up in her bloodstream and become toxic.  This is why Dr. Flynn is continued it.  She does a lot of self-management of her blood pressure and has done so for years.  If she would like to stop the carvedilol, we could instead try metoprolol tartrate 100 mg twice a day

## 2024-05-30 NOTE — TELEPHONE ENCOUNTER
Patient called back this morning stating that the pharmacist wouldn't fill her metoprolol because it's \"too high of a dose and it will kill me since I'm taking carvedilol\" I advised patient that I told her yesterday to stop the carvedilol. Patient states that she wasn't told that and doesn't even remember talking about her medication. Again reminded patient that she was told to stop the carvedilol. Patient states, \"I don't want to stop the carvedilol, I just want something to take around 3pm to help my heart rate. What am I supposed to do about my heart rate right now.\" Advised her to take her carvedilol since pharmacist wouldn't fill her metoprolol. Patient states \"I can't take it, my blood pressure is too low\" I asked patient what it is and she stated \"121\" Advised she can take her carvedilol with her systolic being 121. Patient states \"Dr. Andrade told me not to take my carvedilol if my number was under 130\". Advised patient that I would make you aware. She would like a call back today.

## 2024-05-30 NOTE — TELEPHONE ENCOUNTER
Patient has called back again, she wants you to be aware she does not want to stop her carvedilol as it has worked well for her for so many years.  She just wants you to be aware she wants a replacement for her digoxin, that's all she needs.

## 2024-05-30 NOTE — TELEPHONE ENCOUNTER
Called and spoke with patient, she advised it was too difficult for her to drive to [place], so she requested phone visit with Lani.  Scheduled for 6/5/24 as it was Lani's earlier appointment.

## 2024-05-30 NOTE — TELEPHONE ENCOUNTER
I think too many calls back-and-forth and not understanding what it is that she is needing and requesting.  I did just see her in the office recently.  If she wants to make further changes she will need to schedule an appointment

## 2024-06-05 ENCOUNTER — SCHEDULED TELEPHONE ENCOUNTER (OUTPATIENT)
Dept: CARDIOLOGY CLINIC | Age: 89
End: 2024-06-05
Payer: MEDICARE

## 2024-06-05 DIAGNOSIS — I10 ESSENTIAL HYPERTENSION: ICD-10-CM

## 2024-06-05 DIAGNOSIS — I48.20 CHRONIC ATRIAL FIBRILLATION (HCC): ICD-10-CM

## 2024-06-05 DIAGNOSIS — R00.0 TACHYCARDIA: Primary | ICD-10-CM

## 2024-06-05 PROCEDURE — 99214 OFFICE O/P EST MOD 30 MIN: CPT | Performed by: CLINICAL NURSE SPECIALIST

## 2024-06-05 PROCEDURE — 1123F ACP DISCUSS/DSCN MKR DOCD: CPT | Performed by: CLINICAL NURSE SPECIALIST

## 2024-06-05 RX ORDER — DILTIAZEM HYDROCHLORIDE 120 MG/1
120 CAPSULE, COATED, EXTENDED RELEASE ORAL NIGHTLY
Qty: 90 CAPSULE | Refills: 3 | Status: SHIPPED | OUTPATIENT
Start: 2024-06-05

## 2024-06-05 RX ORDER — CARVEDILOL 25 MG/1
25 TABLET ORAL 2 TIMES DAILY WITH MEALS
COMMUNITY

## 2024-06-05 ASSESSMENT — ENCOUNTER SYMPTOMS
FACIAL SWELLING: 0
CHEST TIGHTNESS: 0
SHORTNESS OF BREATH: 0
NAUSEA: 0
VOMITING: 0
COUGH: 0
WHEEZING: 0
ABDOMINAL PAIN: 0
EYE REDNESS: 0

## 2024-06-05 NOTE — PROGRESS NOTES
Documentation:  I communicated with the patient and/or health care decision maker about HTN, tachycaria, AF  Details of this discussion including any medical advice provided:     Total Time: minutes: 21-30 minutes    Margaux Rich was evaluated through a synchronous (real-time) audio encounter. Patient identification was verified at the start of the visit. She (or guardian if applicable) is aware that this is a billable service, which includes applicable co-pays. This visit was conducted with the patient's (and/or legal guardian's) verbal consent. She has not had a related appointment within my department in the past 7 days or scheduled within the next 24 hours.   The patient was located at Home: [address] [place] [address] [place] [address].  The provider was located at Facility (Appt Dept): [address] [place] [address].  [address]  [place] [address].    Note: not billable if this call serves to triage the patient into an appointment for the relevant concern  Yes, I confirm.       JONATHAN Hatch      Diagnosis Orders   1. Tachycardia        2. Chronic atrial fibrillation (HCC)        3. Essential hypertension           HPI:    Margaux Rich (:  1933) has requested an telephone evaluation for the following concern(s):    Patient follows with our office with a history of chronic atrial fibrillation history of labile hypertension.     In recent months, she feels her blood pressure has been better controlled.  She generally self manages her medication.  She is fearful about blood pressure getting low.  She uses clonidine and losartan on an as-needed basis.  She takes her blood pressure frequently throughout the day.    At office visit with Dr. Flynn on 10/26/2023, digoxin was discontinued.  She has felt like her heart rate has not been well-controlled since this time, however she was seen in our office on 2024 and her heart rate was stable at 70.  She states she is noticing heart rate ranging from 110-118,

## 2024-06-23 DIAGNOSIS — I48.20 CHRONIC ATRIAL FIBRILLATION (HCC): ICD-10-CM

## 2024-06-25 ENCOUNTER — TELEPHONE (OUTPATIENT)
Dept: CARDIOLOGY CLINIC | Age: 89
End: 2024-06-25

## 2024-06-25 NOTE — TELEPHONE ENCOUNTER
Margaux returned a missed call from the office to advise that she can not and does not do MyChart, she would like to do a telephone visit please. I have confirmed all the details with the patient and the registration is up to date.    Thank you.

## 2024-06-26 ENCOUNTER — SCHEDULED TELEPHONE ENCOUNTER (OUTPATIENT)
Dept: CARDIOLOGY CLINIC | Age: 89
End: 2024-06-26
Payer: MEDICARE

## 2024-06-26 DIAGNOSIS — R00.0 TACHYCARDIA: ICD-10-CM

## 2024-06-26 DIAGNOSIS — I10 ESSENTIAL HYPERTENSION: ICD-10-CM

## 2024-06-26 DIAGNOSIS — I48.20 CHRONIC ATRIAL FIBRILLATION (HCC): Primary | ICD-10-CM

## 2024-06-26 PROCEDURE — 99213 OFFICE O/P EST LOW 20 MIN: CPT | Performed by: CLINICAL NURSE SPECIALIST

## 2024-06-26 PROCEDURE — 1123F ACP DISCUSS/DSCN MKR DOCD: CPT | Performed by: CLINICAL NURSE SPECIALIST

## 2024-06-26 ASSESSMENT — ENCOUNTER SYMPTOMS
VOMITING: 0
CHEST TIGHTNESS: 0
SHORTNESS OF BREATH: 0
NAUSEA: 0
EYE REDNESS: 0
COUGH: 0
FACIAL SWELLING: 0
ABDOMINAL PAIN: 0
WHEEZING: 0

## 2024-06-26 NOTE — PROGRESS NOTES
Documentation:  I communicated with the patient and/or health care decision maker about HTN, tachycardia, AF  Details of this discussion including any medical advice provided:     Total Time: 16 min    Margaux Rich was evaluated through a synchronous (real-time) audio encounter. Patient identification was verified at the start of the visit. She (or guardian if applicable) is aware that this is a billable service, which includes applicable co-pays. This visit was conducted with the patient's (and/or legal guardian's) verbal consent. She has not had a related appointment within my department in the past 7 days or scheduled within the next 24 hours.   The patient was located at Home: [address] [place] [address].  The provider was located at Facility (Appt Dept): [address] [place] [address].  [address]  [place] [address].    Note: not billable if this call serves to triage the patient into an appointment for the relevant concern  Yes, I confirm.       JONATHAN Hatch      Diagnosis Orders   1. Chronic atrial fibrillation (HCC)        2. Tachycardia        3. Essential hypertension             HPI:    Margaux Rich (:  1933) has requested an telephone evaluation for the following concern(s):    Patient follows with our office with a history of chronic atrial fibrillation history of labile hypertension.     In recent months, she feels her blood pressure has been better controlled.  She generally self manages her medication.  She is fearful about blood pressure getting low.  She uses clonidine and losartan on an as-needed basis.  She takes her blood pressure frequently throughout the day.    At office visit with Dr. Flynn on 10/26/2023, digoxin was discontinued.  She has felt like her heart rate has not been well-controlled since this time, however she was seen in our office on 2024 and her heart rate was stable at 70.  She states she is noticing heart rate ranging from 110-118, faster than

## 2024-07-22 RX ORDER — CARVEDILOL 25 MG/1
25 TABLET ORAL 2 TIMES DAILY WITH MEALS
Qty: 60 TABLET | Refills: 5 | Status: SHIPPED | OUTPATIENT
Start: 2024-07-22

## 2024-11-11 ENCOUNTER — OFFICE VISIT (OUTPATIENT)
Dept: CARDIOLOGY CLINIC | Age: 89
End: 2024-11-11

## 2024-11-11 VITALS
SYSTOLIC BLOOD PRESSURE: 108 MMHG | HEART RATE: 96 BPM | HEIGHT: 62 IN | OXYGEN SATURATION: 94 % | WEIGHT: 156 LBS | BODY MASS INDEX: 28.71 KG/M2 | DIASTOLIC BLOOD PRESSURE: 70 MMHG

## 2024-11-11 DIAGNOSIS — R60.0 EDEMA OF BOTH LOWER EXTREMITIES: ICD-10-CM

## 2024-11-11 DIAGNOSIS — I48.20 CHRONIC ATRIAL FIBRILLATION (HCC): ICD-10-CM

## 2024-11-11 DIAGNOSIS — R00.0 TACHYCARDIA: ICD-10-CM

## 2024-11-11 DIAGNOSIS — R00.2 PALPITATIONS: Primary | ICD-10-CM

## 2024-11-11 DIAGNOSIS — I10 ESSENTIAL HYPERTENSION: ICD-10-CM

## 2024-11-11 PROCEDURE — 93242 EXT ECG>48HR<7D RECORDING: CPT | Performed by: CLINICAL NURSE SPECIALIST

## 2024-11-11 RX ORDER — ESTRADIOL 10 UG/1
TABLET VAGINAL
COMMUNITY
Start: 2024-10-30

## 2024-11-11 ASSESSMENT — ENCOUNTER SYMPTOMS
WHEEZING: 0
ABDOMINAL PAIN: 0
CHEST TIGHTNESS: 0
EYE REDNESS: 0
VOMITING: 0
NAUSEA: 0
FACIAL SWELLING: 0
COUGH: 0
SHORTNESS OF BREATH: 0

## 2024-11-11 NOTE — PATIENT INSTRUCTIONS
Return in about 3 weeks (around 12/2/2024) for APRN.  Heart Monitor 1 week  Elevate legs, try zip up compression stockings, low sodium in your diet

## 2024-11-11 NOTE — PROGRESS NOTES
[facility], [facility]  [address] [address]  Phone: [phone]  Fax: [phone]    OFFICE VISIT:  2024    Margaux Rich - : 1933    Reason For Visit:  Margaux is a 89 y.o. female who is here for Follow-up (No cardiac symptoms) and Chronic atrial fibrillation       Diagnosis Orders   1. Palpitations  Extended cardiac holter monitor (3 days - 15 days)      2. Chronic atrial fibrillation (HCC)        3. Tachycardia        4. Essential hypertension        5. Edema of both lower extremities                HPI  Patient follows with our office with a history of chronic atrial fibrillation history of labile hypertension.    In recent months, she feels her blood pressure has been better controlled.  She generally self manages her medication.  She is fearful about blood pressure getting low.  She uses clonidine and losartan on an as-needed basis.  She takes her blood pressure frequently throughout the day.    She has some chronic lower extremity edema, no worse than usual.  No sudden weight gain, in fact she is losing weight.  She denies orthopnea, PND.  She has no chest pain or palpitations.  Her main complaint is mobility issues.    Her digoxin was stopped by Dr. Flynn last year due to age and risk of toxicity.  She has had more problems with heart rate control since this time.  She does report that her heart rate gets into the 120s.  We attempted to start Cardizem, but she was fearful about taking it.  She continues to have times when her heart rate is in the 120s.  Her resting heart rate here in the office is 96 today.       Chad Silveira MD is PCP.  Margaux Rich has the following history as recorded in [facility]:    Patient Active Problem List    Diagnosis Date Noted    Edema of both lower extremities 2024    Essential hypertension 10/12/2018    Dyslipidemia 10/12/2018    Type 2 diabetes mellitus (HCC) 10/12/2018    Hiatal hernia

## 2024-11-26 ENCOUNTER — TELEPHONE (OUTPATIENT)
Dept: CARDIOLOGY CLINIC | Age: 88
End: 2024-11-26

## 2024-11-26 DIAGNOSIS — R00.2 PALPITATIONS: Primary | ICD-10-CM

## 2024-11-26 DIAGNOSIS — R00.2 PALPITATIONS: ICD-10-CM

## 2024-11-26 PROCEDURE — 93244 EXT ECG>48HR<7D REV&INTERPJ: CPT | Performed by: CLINICAL NURSE SPECIALIST

## 2024-11-26 NOTE — TELEPHONE ENCOUNTER
Please let patient know I got the result of her heart monitor back.  She does have frequent to fast heart rates with average heart rate of 94.  Would like to see better heart rate control.    Please clarify if she taking diltiazem at all?  Is she taking the carvedilol 25 twice a day?    Please clarify and then I will recommend changes

## 2024-12-02 ENCOUNTER — SCHEDULED TELEPHONE ENCOUNTER (OUTPATIENT)
Dept: CARDIOLOGY CLINIC | Age: 88
End: 2024-12-02
Payer: MEDICARE

## 2024-12-02 DIAGNOSIS — I10 ESSENTIAL HYPERTENSION: ICD-10-CM

## 2024-12-02 DIAGNOSIS — R00.0 TACHYCARDIA: ICD-10-CM

## 2024-12-02 DIAGNOSIS — I48.20 CHRONIC ATRIAL FIBRILLATION (HCC): Primary | ICD-10-CM

## 2024-12-02 DIAGNOSIS — R60.0 EDEMA OF BOTH LOWER EXTREMITIES: ICD-10-CM

## 2024-12-02 PROCEDURE — 1123F ACP DISCUSS/DSCN MKR DOCD: CPT | Performed by: CLINICAL NURSE SPECIALIST

## 2024-12-02 PROCEDURE — 1159F MED LIST DOCD IN RCRD: CPT | Performed by: CLINICAL NURSE SPECIALIST

## 2024-12-02 PROCEDURE — 99214 OFFICE O/P EST MOD 30 MIN: CPT | Performed by: CLINICAL NURSE SPECIALIST

## 2024-12-02 RX ORDER — DIGOXIN 125 MCG
125 TABLET ORAL EVERY OTHER DAY
Qty: 45 TABLET | Refills: 3 | Status: SHIPPED | OUTPATIENT
Start: 2024-12-02

## 2024-12-02 ASSESSMENT — ENCOUNTER SYMPTOMS
NAUSEA: 0
WHEEZING: 0
ABDOMINAL PAIN: 0
VOMITING: 0
CHEST TIGHTNESS: 0
EYE REDNESS: 0
FACIAL SWELLING: 0
SHORTNESS OF BREATH: 0
COUGH: 0

## 2024-12-02 NOTE — PROGRESS NOTES
Clavulanate]     Biaxin [Clarithromycin]     Cardizem [Diltiazem] Diarrhea    Codeine     Keflex [Cephalexin]     Paxil [Paroxetine Hcl]     Sulfa Antibiotics        Past Medical History:   Diagnosis Date    Afib (HCC)     Diabetes mellitus (HCC)     Eczema     GERD (gastroesophageal reflux disease)     Hyperlipidemia     Hypertension     Osteoarthritis     Thyroid disease        Past Surgical History:   Procedure Laterality Date    JOINT REPLACEMENT Right 2013    SHOULDER SURGERY Left 2006       Family History   Problem Relation Age of Onset    Other Mother     Other Father              DATA:  Patient follows with our office with a history of chronic atrial fibrillation history of labile hypertension.     In recent months, she feels her blood pressure has been better controlled.  She generally self manages her medication.  She is fearful about blood pressure getting low.  She uses clonidine and losartan on an as-needed basis.  She takes her blood pressure frequently throughout the day.     She has some chronic lower extremity edema, no worse than usual.  No sudden weight gain, in fact she is losing weight.  She denies orthopnea, PND.  She has no chest pain or palpitations.  Her main complaint is mobility issues.     Her digoxin was stopped by Dr. Flynn last year due to age and risk of toxicity.  She has had more problems with heart rate control since this time.  She does report that her heart rate gets into the 120s.  We attempted to start Cardizem, but she was fearful about taking it.  She continues to have times when her heart rate is in the 120s.  Her resting heart rate here in the office is 96 today.         ASSESSMENT/PLAN:  1. Chronic atrial fibrillation (HCC)  Difficulty with rate control.  She is on the maximum dose of carvedilol and states she cannot tolerate Cardizem due to side effects.  She feels she has done poorly since the digoxin was stopped.  We discussed the risk of digoxin in elderly patients.

## 2024-12-02 NOTE — PATIENT INSTRUCTIONS
Get in more 3 bottles of water daily    For swelling- elevate legs when sitting, use compression stockings, low sodium diet    Consider Spironolactone in future for swelling if no improvement    For heart rate control- Restart Digoxin 0.125mg every other day

## 2025-02-17 RX ORDER — CARVEDILOL 25 MG/1
25 TABLET ORAL 2 TIMES DAILY WITH MEALS
Qty: 180 TABLET | Refills: 3 | Status: SHIPPED | OUTPATIENT
Start: 2025-02-17

## 2025-06-02 ENCOUNTER — RESULTS FOLLOW-UP (OUTPATIENT)
Dept: CARDIOLOGY CLINIC | Age: 89
End: 2025-06-02

## 2025-06-02 ENCOUNTER — OFFICE VISIT (OUTPATIENT)
Age: 89
End: 2025-06-02
Payer: MEDICARE

## 2025-06-02 VITALS
HEIGHT: 62 IN | DIASTOLIC BLOOD PRESSURE: 82 MMHG | SYSTOLIC BLOOD PRESSURE: 118 MMHG | HEART RATE: 82 BPM | WEIGHT: 154 LBS | BODY MASS INDEX: 28.34 KG/M2

## 2025-06-02 DIAGNOSIS — R06.02 SHORTNESS OF BREATH: Primary | ICD-10-CM

## 2025-06-02 DIAGNOSIS — I10 ESSENTIAL HYPERTENSION: ICD-10-CM

## 2025-06-02 DIAGNOSIS — I48.21 PERMANENT ATRIAL FIBRILLATION (HCC): ICD-10-CM

## 2025-06-02 DIAGNOSIS — R06.02 SHORTNESS OF BREATH: ICD-10-CM

## 2025-06-02 DIAGNOSIS — R60.0 EDEMA OF BOTH LOWER EXTREMITIES: ICD-10-CM

## 2025-06-02 DIAGNOSIS — I48.21 PERMANENT ATRIAL FIBRILLATION (HCC): Primary | ICD-10-CM

## 2025-06-02 LAB
ANION GAP SERPL CALCULATED.3IONS-SCNC: 9 MMOL/L (ref 8–16)
BNP BLD-MCNC: 1438 PG/ML (ref 0–449)
BUN SERPL-MCNC: 17 MG/DL (ref 8–23)
CALCIUM SERPL-MCNC: 9.1 MG/DL (ref 8.2–9.6)
CHLORIDE SERPL-SCNC: 98 MMOL/L (ref 98–107)
CO2 SERPL-SCNC: 29 MMOL/L (ref 22–29)
CREAT SERPL-MCNC: 0.7 MG/DL (ref 0.5–0.9)
DIGOXIN SERPL-MCNC: 0.8 NG/ML (ref 0.6–1.2)
GLUCOSE SERPL-MCNC: 100 MG/DL (ref 70–99)
POTASSIUM SERPL-SCNC: 4.4 MMOL/L (ref 3.5–5.1)
SODIUM SERPL-SCNC: 136 MMOL/L (ref 136–145)

## 2025-06-02 PROCEDURE — G8427 DOCREV CUR MEDS BY ELIG CLIN: HCPCS | Performed by: CLINICAL NURSE SPECIALIST

## 2025-06-02 PROCEDURE — G8419 CALC BMI OUT NRM PARAM NOF/U: HCPCS | Performed by: CLINICAL NURSE SPECIALIST

## 2025-06-02 PROCEDURE — 1123F ACP DISCUSS/DSCN MKR DOCD: CPT | Performed by: CLINICAL NURSE SPECIALIST

## 2025-06-02 PROCEDURE — 99214 OFFICE O/P EST MOD 30 MIN: CPT | Performed by: CLINICAL NURSE SPECIALIST

## 2025-06-02 PROCEDURE — 93010 ELECTROCARDIOGRAM REPORT: CPT | Performed by: CLINICAL NURSE SPECIALIST

## 2025-06-02 PROCEDURE — 93005 ELECTROCARDIOGRAM TRACING: CPT | Performed by: CLINICAL NURSE SPECIALIST

## 2025-06-02 PROCEDURE — 1090F PRES/ABSN URINE INCON ASSESS: CPT | Performed by: CLINICAL NURSE SPECIALIST

## 2025-06-02 PROCEDURE — 1159F MED LIST DOCD IN RCRD: CPT | Performed by: CLINICAL NURSE SPECIALIST

## 2025-06-02 RX ORDER — BUMETANIDE 1 MG/1
1 TABLET ORAL SEE ADMIN INSTRUCTIONS
Qty: 100 TABLET | Refills: 3 | Status: SHIPPED | OUTPATIENT
Start: 2025-06-02

## 2025-06-02 RX ORDER — LORATADINE 10 MG/1
10 CAPSULE, LIQUID FILLED ORAL DAILY
COMMUNITY

## 2025-06-02 RX ORDER — CODEINE PHOSPHATE AND GUAIFENESIN 10; 100 MG/5ML; MG/5ML
5 SOLUTION ORAL 3 TIMES DAILY PRN
COMMUNITY

## 2025-06-02 RX ORDER — SPIRONOLACTONE 25 MG/1
25 TABLET ORAL DAILY
Qty: 30 TABLET | Refills: 5 | Status: SHIPPED | OUTPATIENT
Start: 2025-06-02

## 2025-06-02 ASSESSMENT — ENCOUNTER SYMPTOMS
VOMITING: 0
SHORTNESS OF BREATH: 0
FACIAL SWELLING: 0
NAUSEA: 0
EYE REDNESS: 0
ABDOMINAL DISTENTION: 1
COUGH: 1
CHEST TIGHTNESS: 0
WHEEZING: 0
ABDOMINAL PAIN: 0

## 2025-06-02 NOTE — PROGRESS NOTES
[facility], [facility]  [address] [address]  Phone: [phone]  Fax: [phone]    OFFICE VISIT:  2025    Margaux Rich - : 1933    Reason For Visit:  Margaux is a 89 y.o. female who is here for Follow-up (Patient is swelling in legs) and Palpitations       Diagnosis Orders   1. Permanent atrial fibrillation (HCC)  Digoxin Level      2. Edema of both lower extremities  EKG 12 lead    Brain Natriuretic Peptide    Basic Metabolic Panel      3. Shortness of breath  Brain Natriuretic Peptide    Basic Metabolic Panel      4. Essential hypertension  EKG 12 lead    Brain Natriuretic Peptide    Basic Metabolic Panel              HPI  Patient follows with our office with a history of chronic atrial fibrillation history of labile hypertension chronic lower extremity edema.    Ongoing issues with concerns about labile blood pressure, recently more stable    She has some chronic lower extremity edema which concerns her a lot.  She has difficulty wearing compression stockings because she is unable to put them on herself.  She often sits with her feet dependent.  She does not use a recliner chair    Her digoxin was stopped by Dr. Flynn last year due to age and risk of toxicity.  She has had more problems with heart rate control since this time.  She does report that her heart rate gets into the 120s.  We attempted to start Cardizem, but she was fearful about taking it.  Zio patch heart monitor showed uncontrolled heart rate.  We started back digoxin at 0.125 mg on .  She reports heart rate control has been better since this time     She is concerned about swelling in her abdomen area and recently had a CT of the abdomen done by her PCP.  She reports no significant findings and there were no recommendations regarding her swelling.  Magnesium was found to be low in dosage of her supplement increased    Continued lower extremity edema, no

## 2025-06-03 ENCOUNTER — TELEPHONE (OUTPATIENT)
Dept: CARDIOLOGY CLINIC | Age: 89
End: 2025-06-03

## 2025-06-03 NOTE — TELEPHONE ENCOUNTER
Did labs on patient today with complaints of edema, weight gain and abdominal distention.  Her BNP was elevated started Bumex and spironolactone.  If you could do a follow-up call next week around Mayte 10 to see how she is doing, I would appreciate it.  I will be off that week

## 2025-06-12 ENCOUNTER — TELEPHONE (OUTPATIENT)
Dept: CARDIOLOGY CLINIC | Age: 89
End: 2025-06-12

## 2025-06-12 NOTE — TELEPHONE ENCOUNTER
Called pt back and advised of Bumex 0.5 mg daily. She voices understanding.   
Patient called and reports that she has started feeling really fatigued and having muscle spasms/cramps. She recently started Spironolactone 25 mg daily on 6/2/25 as well as Bumex 1 mg BID x 5 days and is now down to Bumex 1 mg daily. She reports that she has lost 10 pounds since starting them and the swelling in her legs/feet are much better. She is requesting to back off one of the medications so that she has the energy to get up and do things. Could you advise?  
Unknown at this time

## 2025-06-19 ENCOUNTER — TELEPHONE (OUTPATIENT)
Dept: CARDIOLOGY CLINIC | Age: 89
End: 2025-06-19

## 2025-06-19 NOTE — TELEPHONE ENCOUNTER
Called and lvm with patient to return call to remind her to go have labs. Called [facility] where patient was supposed to complete labs at beginning of this week and patient did not show up to have labs drawn.

## 2025-06-23 DIAGNOSIS — I48.20 CHRONIC ATRIAL FIBRILLATION (HCC): ICD-10-CM

## 2025-06-23 RX ORDER — APIXABAN 2.5 MG/1
2.5 TABLET, FILM COATED ORAL 2 TIMES DAILY
Qty: 60 TABLET | Refills: 1 | Status: SHIPPED | OUTPATIENT
Start: 2025-06-23

## 2025-06-24 ENCOUNTER — SCHEDULED TELEPHONE ENCOUNTER (OUTPATIENT)
Dept: CARDIOLOGY CLINIC | Age: 89
End: 2025-06-24
Payer: MEDICARE

## 2025-06-24 DIAGNOSIS — I48.20 CHRONIC ATRIAL FIBRILLATION (HCC): ICD-10-CM

## 2025-06-24 DIAGNOSIS — I10 ESSENTIAL HYPERTENSION: ICD-10-CM

## 2025-06-24 DIAGNOSIS — I50.32 CHRONIC DIASTOLIC HEART FAILURE (HCC): Primary | ICD-10-CM

## 2025-06-24 DIAGNOSIS — R06.02 SHORTNESS OF BREATH: ICD-10-CM

## 2025-06-24 PROCEDURE — 1123F ACP DISCUSS/DSCN MKR DOCD: CPT | Performed by: CLINICAL NURSE SPECIALIST

## 2025-06-24 PROCEDURE — 99214 OFFICE O/P EST MOD 30 MIN: CPT | Performed by: CLINICAL NURSE SPECIALIST

## 2025-06-24 RX ORDER — CARVEDILOL 12.5 MG/1
12.5 TABLET ORAL 2 TIMES DAILY
Qty: 180 TABLET | Refills: 3 | Status: SHIPPED | OUTPATIENT
Start: 2025-06-24

## 2025-06-24 RX ORDER — BUMETANIDE 0.5 MG/1
0.5 TABLET ORAL DAILY
Qty: 90 TABLET | Refills: 3 | Status: SHIPPED | OUTPATIENT
Start: 2025-06-24

## 2025-06-24 ASSESSMENT — ENCOUNTER SYMPTOMS
ABDOMINAL PAIN: 0
COUGH: 0
CHEST TIGHTNESS: 0
NAUSEA: 0
SHORTNESS OF BREATH: 1
FACIAL SWELLING: 0
VOMITING: 0
EYE REDNESS: 0
WHEEZING: 0

## 2025-06-24 NOTE — PROGRESS NOTES
Margaux Rich is a 89 y.o. female evaluated via telephone on 2025.      Documentation:  I communicated with the patient and/or health care decision maker about CHF  Details of this discussion including any medical advice provided:    Total Time: minutes: 21-30 minutes    Margaux Rich was evaluated through a synchronous (real-time) audio encounter. Patient identification was verified at the start of the visit. She (or guardian if applicable) is aware that this is a billable service, which includes applicable co-pays. This visit was conducted with the patient's (and/or legal guardian's) verbal consent. She has not had a related appointment within my department in the past 7 days or scheduled within the next 24 hours.   The patient was located at Home: [address] [place] [address].  The provider was located at Facility (Appt Dept): [address] [place] [address].  [address]  [place] [address].    Note: not billable if this call serves to triage the patient into an appointment for the relevant concern  Yes, I confirm.       HPI:    Margaux Rich (:  1933) has requested an audio/video evaluation for the following concern(s):    Patient follows with our office with a history of chronic atrial fibrillation history of labile hypertension chronic lower extremity edema.     Ongoing issues with concerns about labile blood pressure, recently more stable    At recent visit, there was concern about increasing lower extremity edema, cough at night.  We did labs and her BNP was elevated.  She was diuresed with Bumex and spironolactone.  She reports she has lost weight, edema has resolved, and she is breathing easily.  PCP decreased her Bumex dose to 0.5 mg daily.    She has noticed since she has been diuresed that her blood pressure is running on the low side.  She is not able to tolerate her carvedilol twice a day and currently only taking in the morning          Review of Systems   Constitutional:

## 2025-06-24 NOTE — PATIENT INSTRUCTIONS
Return for APRN, as scheduled.  Send note to Dr Chad Silveira PCP in [place]  Decrease carvedilol to 12.5 mg twice a day  Will send in current dosage of Bumex 0.5 mg daily so that you do not have to break the 1 mg tablet in half    - Weigh daily every morning after urinating (this is your dry weight).   Report weight gain of 3lbs or more in 24hrs or 5lbs in one week.  - Call for increasing shortness of breath or increasing swelling in feet and legs.    (This could mean you are retaining too much fluid)  - 2000mg low sodium diet

## 2025-08-28 DIAGNOSIS — I48.20 CHRONIC ATRIAL FIBRILLATION (HCC): ICD-10-CM

## 2025-08-28 RX ORDER — APIXABAN 2.5 MG/1
2.5 TABLET, FILM COATED ORAL 2 TIMES DAILY
Qty: 60 TABLET | Refills: 5 | Status: SHIPPED | OUTPATIENT
Start: 2025-08-28 | End: 2025-08-28

## 2025-08-28 RX ORDER — APIXABAN 2.5 MG/1
2.5 TABLET, FILM COATED ORAL 2 TIMES DAILY
Qty: 60 TABLET | Refills: 3 | Status: SHIPPED | OUTPATIENT
Start: 2025-08-28